# Patient Record
Sex: FEMALE | Race: OTHER | URBAN - METROPOLITAN AREA
[De-identification: names, ages, dates, MRNs, and addresses within clinical notes are randomized per-mention and may not be internally consistent; named-entity substitution may affect disease eponyms.]

---
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
## 2019-07-30 ENCOUNTER — EMERGENCY (EMERGENCY)
Facility: HOSPITAL | Age: 19
LOS: 0 days | Discharge: HOME | End: 2019-07-31
Attending: EMERGENCY MEDICINE | Admitting: EMERGENCY MEDICINE
Payer: SUBSIDIZED

## 2019-07-30 VITALS
HEART RATE: 56 BPM | TEMPERATURE: 98 F | OXYGEN SATURATION: 99 % | RESPIRATION RATE: 18 BRPM | DIASTOLIC BLOOD PRESSURE: 68 MMHG | SYSTOLIC BLOOD PRESSURE: 112 MMHG

## 2019-07-30 VITALS
SYSTOLIC BLOOD PRESSURE: 135 MMHG | RESPIRATION RATE: 18 BRPM | TEMPERATURE: 98 F | OXYGEN SATURATION: 98 % | HEART RATE: 57 BPM | DIASTOLIC BLOOD PRESSURE: 69 MMHG

## 2019-07-30 DIAGNOSIS — N83.209 UNSPECIFIED OVARIAN CYST, UNSPECIFIED SIDE: ICD-10-CM

## 2019-07-30 DIAGNOSIS — N63.0 UNSPECIFIED LUMP IN UNSPECIFIED BREAST: ICD-10-CM

## 2019-07-30 DIAGNOSIS — R10.9 UNSPECIFIED ABDOMINAL PAIN: ICD-10-CM

## 2019-07-30 DIAGNOSIS — Z88.0 ALLERGY STATUS TO PENICILLIN: ICD-10-CM

## 2019-07-30 DIAGNOSIS — R11.0 NAUSEA: ICD-10-CM

## 2019-07-30 LAB
ALBUMIN SERPL ELPH-MCNC: 4.4 G/DL — SIGNIFICANT CHANGE UP (ref 3.5–5.2)
ALP SERPL-CCNC: 78 U/L — SIGNIFICANT CHANGE UP (ref 30–115)
ALT FLD-CCNC: 26 U/L — SIGNIFICANT CHANGE UP (ref 0–41)
ANION GAP SERPL CALC-SCNC: 18 MMOL/L — HIGH (ref 7–14)
APPEARANCE UR: ABNORMAL
AST SERPL-CCNC: 22 U/L — SIGNIFICANT CHANGE UP (ref 0–41)
BACTERIA # UR AUTO: ABNORMAL /HPF
BASOPHILS # BLD AUTO: 0.03 K/UL — SIGNIFICANT CHANGE UP (ref 0–0.2)
BASOPHILS NFR BLD AUTO: 0.4 % — SIGNIFICANT CHANGE UP (ref 0–1)
BILIRUB SERPL-MCNC: 0.3 MG/DL — SIGNIFICANT CHANGE UP (ref 0.2–1.2)
BILIRUB UR-MCNC: ABNORMAL
BUN SERPL-MCNC: 4 MG/DL — LOW (ref 10–20)
CALCIUM SERPL-MCNC: 10.1 MG/DL — SIGNIFICANT CHANGE UP (ref 8.5–10.1)
CHLORIDE SERPL-SCNC: 102 MMOL/L — SIGNIFICANT CHANGE UP (ref 98–110)
CO2 SERPL-SCNC: 23 MMOL/L — SIGNIFICANT CHANGE UP (ref 17–32)
COLOR SPEC: ABNORMAL
CREAT SERPL-MCNC: 0.7 MG/DL — SIGNIFICANT CHANGE UP (ref 0.7–1.5)
DIFF PNL FLD: ABNORMAL
EOSINOPHIL # BLD AUTO: 0.05 K/UL — SIGNIFICANT CHANGE UP (ref 0–0.7)
EOSINOPHIL NFR BLD AUTO: 0.6 % — SIGNIFICANT CHANGE UP (ref 0–8)
EPI CELLS # UR: ABNORMAL /HPF
GLUCOSE SERPL-MCNC: 103 MG/DL — HIGH (ref 70–99)
GLUCOSE UR QL: NEGATIVE — SIGNIFICANT CHANGE UP
HCG UR QL: NEGATIVE — SIGNIFICANT CHANGE UP
HCT VFR BLD CALC: 42.8 % — SIGNIFICANT CHANGE UP (ref 37–47)
HGB BLD-MCNC: 14 G/DL — SIGNIFICANT CHANGE UP (ref 12–16)
IMM GRANULOCYTES NFR BLD AUTO: 0.6 % — HIGH (ref 0.1–0.3)
KETONES UR-MCNC: >=80
LACTATE SERPL-SCNC: 1.2 MMOL/L — SIGNIFICANT CHANGE UP (ref 0.5–2.2)
LEUKOCYTE ESTERASE UR-ACNC: ABNORMAL
LIDOCAIN IGE QN: 10 U/L — SIGNIFICANT CHANGE UP (ref 7–60)
LYMPHOCYTES # BLD AUTO: 1.12 K/UL — LOW (ref 1.2–3.4)
LYMPHOCYTES # BLD AUTO: 14 % — LOW (ref 20.5–51.1)
MCHC RBC-ENTMCNC: 28.5 PG — SIGNIFICANT CHANGE UP (ref 27–31)
MCHC RBC-ENTMCNC: 32.7 G/DL — SIGNIFICANT CHANGE UP (ref 32–37)
MCV RBC AUTO: 87.2 FL — SIGNIFICANT CHANGE UP (ref 81–99)
MONOCYTES # BLD AUTO: 0.39 K/UL — SIGNIFICANT CHANGE UP (ref 0.1–0.6)
MONOCYTES NFR BLD AUTO: 4.9 % — SIGNIFICANT CHANGE UP (ref 1.7–9.3)
NEUTROPHILS # BLD AUTO: 6.36 K/UL — SIGNIFICANT CHANGE UP (ref 1.4–6.5)
NEUTROPHILS NFR BLD AUTO: 79.5 % — HIGH (ref 42.2–75.2)
NITRITE UR-MCNC: POSITIVE
NRBC # BLD: 0 /100 WBCS — SIGNIFICANT CHANGE UP (ref 0–0)
PH UR: 5.5 — SIGNIFICANT CHANGE UP (ref 5–8)
PLATELET # BLD AUTO: 200 K/UL — SIGNIFICANT CHANGE UP (ref 130–400)
POTASSIUM SERPL-MCNC: 4.1 MMOL/L — SIGNIFICANT CHANGE UP (ref 3.5–5)
POTASSIUM SERPL-SCNC: 4.1 MMOL/L — SIGNIFICANT CHANGE UP (ref 3.5–5)
PROT SERPL-MCNC: 7.9 G/DL — SIGNIFICANT CHANGE UP (ref 6–8)
PROT UR-MCNC: 100
RBC # BLD: 4.91 M/UL — SIGNIFICANT CHANGE UP (ref 4.2–5.4)
RBC # FLD: 16.3 % — HIGH (ref 11.5–14.5)
RBC CASTS # UR COMP ASSIST: >50 /HPF
SODIUM SERPL-SCNC: 143 MMOL/L — SIGNIFICANT CHANGE UP (ref 135–146)
SP GR SPEC: 1.02 — SIGNIFICANT CHANGE UP (ref 1.01–1.03)
UROBILINOGEN FLD QL: 1 (ref 0.2–0.2)
WBC # BLD: 8 K/UL — SIGNIFICANT CHANGE UP (ref 4.8–10.8)
WBC # FLD AUTO: 8 K/UL — SIGNIFICANT CHANGE UP (ref 4.8–10.8)
WBC UR QL: ABNORMAL /HPF

## 2019-07-30 PROCEDURE — 99283 EMERGENCY DEPT VISIT LOW MDM: CPT

## 2019-07-30 PROCEDURE — 99284 EMERGENCY DEPT VISIT MOD MDM: CPT

## 2019-07-30 PROCEDURE — 74177 CT ABD & PELVIS W/CONTRAST: CPT | Mod: 26

## 2019-07-30 PROCEDURE — 74022 RADEX COMPL AQT ABD SERIES: CPT | Mod: 26

## 2019-07-30 RX ORDER — ONDANSETRON 8 MG/1
4 TABLET, FILM COATED ORAL ONCE
Refills: 0 | Status: COMPLETED | OUTPATIENT
Start: 2019-07-30 | End: 2019-07-30

## 2019-07-30 RX ORDER — IOHEXOL 300 MG/ML
30 INJECTION, SOLUTION INTRAVENOUS ONCE
Refills: 0 | Status: COMPLETED | OUTPATIENT
Start: 2019-07-30 | End: 2019-07-30

## 2019-07-30 RX ORDER — SODIUM CHLORIDE 9 MG/ML
1000 INJECTION INTRAMUSCULAR; INTRAVENOUS; SUBCUTANEOUS ONCE
Refills: 0 | Status: COMPLETED | OUTPATIENT
Start: 2019-07-30 | End: 2019-07-30

## 2019-07-30 RX ADMIN — ONDANSETRON 4 MILLIGRAM(S): 8 TABLET, FILM COATED ORAL at 19:04

## 2019-07-30 RX ADMIN — SODIUM CHLORIDE 1000 MILLILITER(S): 9 INJECTION INTRAMUSCULAR; INTRAVENOUS; SUBCUTANEOUS at 19:04

## 2019-07-30 RX ADMIN — IOHEXOL 30 MILLILITER(S): 300 INJECTION, SOLUTION INTRAVENOUS at 19:00

## 2019-07-30 RX ADMIN — SODIUM CHLORIDE 1000 MILLILITER(S): 9 INJECTION INTRAMUSCULAR; INTRAVENOUS; SUBCUTANEOUS at 21:25

## 2019-07-30 NOTE — CONSULT NOTE ADULT - ASSESSMENT
Assessment:  19 year old female 3 months s/p laparoscopic sleeve gastrectomy in [place], lost 60lbs, presents with persistent nausea and dry heave that starts in the morning but resolves at night. She is currently on a smoothie diet and can only tolerate PO in the evening.     Plan:  - f/u CT AP read  - PPI  - abx for UTI   - No acute surgical intervention  - f/u with primary surgeon Assessment:  19 year old female 3 months s/p laparoscopic sleeve gastrectomy in [place], lost 60lbs, presents with persistent nausea and dry heave that starts in the morning but resolves at night. She is currently on a smoothie diet and can only tolerate PO in the evening.     Plan:  - f/u CT AP read  - PPI  - abx for UTI   - No acute surgical intervention  - f/u with primary surgeon    Senior Resident Note  20yo f 3 months out from lap sleeve done in [place] with am dry heaving which resolves in pm for 5 days, otherwise tolerating diet, no abd pain  abd soft nt nd   ct shows no thickening or twisting of sleeves  labs wnl  +uti needs abx  outpt gi fu for egd/ upper gi series  no acute surgical intervention  Pt seen and examined  Above note has been reviewed and edited  Plan d/w patient and Dr Danita Metcalf Assessment:  19 year old female 3 months s/p laparoscopic sleeve gastrectomy in [place], lost 60lbs, presents with persistent nausea and dry heave that starts in the morning but resolves at night. She is currently on a smoothie diet and can only tolerate PO in the evening.     Plan:  - f/u CT AP read  - PPI  - abx for UTI   - No acute surgical intervention  - f/u with primary surgeon    Senior Resident Note  18yo f 3 months out from lap sleeve done in [place] with am dry heaving which resolves in pm for 5 days, otherwise tolerating diet, no abd pain  abd soft nt nd   ct shows no thickening or twisting of sleeves  labs wnl  outpt gi fu for egd/ upper gi series  no acute surgical intervention out pt fu with her surgeon  +uti needs abx  gyn for ovarian cyst   Pt seen and examined  Above note has been reviewed and edited  Plan d/w patient and Dr Danita Metcalf

## 2019-07-30 NOTE — CONSULT NOTE ADULT - ATTENDING COMMENTS
18yo female s/p laparoscopic sleeve gastrectomy [place] presenting with dry heaving. CT A/P WNL. Recommend F/U with GI and her surgeon if tolerating clears.

## 2019-07-30 NOTE — CONSULT NOTE ADULT - SUBJECTIVE AND OBJECTIVE BOX
MOHSEN, ALAA [id number]  19y Female      HPI:   19 year old female s/p laparoscopic sleeve gastrectomy 3 months ago in [place] presents to ED due to persistent dry heaving for 1 week. Per patient, she experiences nausea and dry heaving in the morning which resolves in the evening, she c/o waterbrash a/w nausea and attributed it to reflux. On further questioning, the patient is a non smoker, does not take any regular medication including NSAIDs, is on a "smoothie diet" and has lost 60lbs since the lap sleeve gastrectomy, and can only tolerate PO in the evening. UA positive possible UTI. CT abdomen and pelvis pending read.     PAST MEDICAL & SURGICAL HISTORY:  h/o SCFE s/p b/l fixation  s/p sleeve gastrectomy in [place] 2019    MEDICATIONS: does not take any medication at home    Allergies:  penicillin (anaphylactic reaction)    REVIEW OF SYSTEMS    [X] A ten-point review of systems was otherwise negative except as noted.  [ ] Due to altered mental status/intubation, subjective information were not able to be obtained from the patient. History was obtained, to the extent possible, from review of the chart and collateral sources of information.      Vital Signs Last 24 Hrs  T(C): 36.4 (2019 17:33), Max: 36.4 (2019 17:33)  T(F): 97.6 (2019 17:33), Max: 97.6 (2019 17:33)  HR: 57 (2019 17:33) (57 - 57)  BP: 135/69 (2019 17:33) (135/69 - 135/69)  RR: 18 (2019 17:33) (18 - 18)  SpO2: 98% (2019 17:33) (98% - 98%)    PHYSICAL EXAM:  GENERAL: NAD, well-appearing  CHEST/LUNG: Clear to auscultation bilaterally  HEART: Regular rate and rhythm  ABDOMEN: Soft, Nontender, Nondistended;   EXTREMITIES:  No clubbing, cyanosis, or edema      LABS:             14.0   8.00  )-----------( 200      ( 2019 18:50 )             42.8       Auto Neutrophil %: 79.5 % (19 @ 18:50)  Auto Immature Granulocyte %: 0.6 % (19 @ 18:50)        143  |  102  |  4<L>  ----------------------------<  103<H>  4.1   |  23  |  0.7    Calcium, Total Serum: 10.1 mg/dL (19 @ 18:50)    LFTs:             7.9  | 0.3  | 22       ------------------[78      ( 2019 18:50 )  4.4  | x    | 26          Lipase:10     Amylase:x         Lactate, Blood: 1.2 mmol/L (19 @ 18:50)    Urinalysis Basic - ( 2019 19:54 )    Color: Red / Appearance: Turbid / S.025 / pH: x  Gluc: x / Ketone: >=80  / Bili: Moderate / Urobili: 1.0   Blood: x / Protein: 100 / Nitrite: Positive   Leuk Esterase: Moderate / RBC: >50 /HPF / WBC 6-10 /HPF   Sq Epi: x / Non Sq Epi: Few /HPF / Bacteria: Many /HPF MOHSEN, ALAA [id number]  19y Female      HPI:   19 year old female s/p laparoscopic sleeve gastrectomy 3 months ago in [place] presents to ED due to persistent dry heaving for 1 week. Per patient, she experiences nausea and dry heaving in the morning which resolves in the evening, she c/o waterbrash a/w nausea and attributed it to reflux. On further questioning, the patient is a non smoker, does not take any regular medication including NSAIDs, is on a "smoothie diet" and has lost 60lbs since the lap sleeve gastrectomy, and can only tolerate PO in the evening. UA positive for UTI. CT abdomen and pelvis neg.     PAST MEDICAL & SURGICAL HISTORY:  h/o SCFE s/p b/l fixation  s/p sleeve gastrectomy in [place] 2019    MEDICATIONS: does not take any medication at home    Allergies:  penicillin (anaphylactic reaction)    REVIEW OF SYSTEMS    [X] A ten-point review of systems was otherwise negative except as noted.  [ ] Due to altered mental status/intubation, subjective information were not able to be obtained from the patient. History was obtained, to the extent possible, from review of the chart and collateral sources of information.      Vital Signs Last 24 Hrs  T(C): 36.4 (2019 17:33), Max: 36.4 (2019 17:33)  T(F): 97.6 (2019 17:33), Max: 97.6 (2019 17:33)  HR: 57 (2019 17:33) (57 - 57)  BP: 135/69 (2019 17:33) (135/69 - 135/69)  RR: 18 (2019 17:33) (18 - 18)  SpO2: 98% (2019 17:33) (98% - 98%)    PHYSICAL EXAM:  GENERAL: NAD, well-appearing  CHEST/LUNG: Clear to auscultation bilaterally  HEART: Regular rate and rhythm  ABDOMEN: Soft, Nontender, Nondistended;   EXTREMITIES:  No clubbing, cyanosis, or edema      LABS:             14.0   8.00  )-----------( 200      ( 2019 18:50 )             42.8       Auto Neutrophil %: 79.5 % (19 @ 18:50)  Auto Immature Granulocyte %: 0.6 % (19 @ 18:50)    07-30    143  |  102  |  4<L>  ----------------------------<  103<H>  4.1   |  23  |  0.7    Calcium, Total Serum: 10.1 mg/dL (19 @ 18:50)    LFTs:             7.9  | 0.3  | 22       ------------------[78      ( 2019 18:50 )  4.4  | x    | 26          Lipase:10     Amylase:x         Lactate, Blood: 1.2 mmol/L (19 @ 18:50)    Urinalysis Basic - ( 2019 19:54 )    Color: Red / Appearance: Turbid / S.025 / pH: x  Gluc: x / Ketone: >=80  / Bili: Moderate / Urobili: 1.0   Blood: x / Protein: 100 / Nitrite: Positive   Leuk Esterase: Moderate / RBC: >50 /HPF / WBC 6-10 /HPF   Sq Epi: x / Non Sq Epi: Few /HPF / Bacteria: Many /HPF    < from: CT Abdomen and Pelvis w/ Oral Cont and w/ IV Cont (19 @ 22:41) >  IMPRESSION:    No CT evidence for acute bowel pathology.  5.6 cm left ovarian cyst.  1.5 cm left breast nodule. Outpatient baseline mammogram/ultrasound can   be obtained.    < end of copied text >

## 2019-07-30 NOTE — CONSULT NOTE ADULT - CONSULT REASON
nausea s/p sleeve gastrectomy in [place] 3 months ago nausea s/p sleeve gastrectomy in [place] 3 months ago

## 2019-07-30 NOTE — ED ADULT NURSE NOTE - NSFALLRSKUNASSIST_ED_ALL_ED
Please tell pt the WBC is low again. Please ask her to decrease azathioprine to 100mg daily instead of 125mg daily and schedule repeat cbc in 10 days. Thanks MINA  
no

## 2019-07-30 NOTE — ED ADULT NURSE NOTE - OBJECTIVE STATEMENT
states was texted from doctor who did abdomen surgery 2 months to come in to "check if there is fluid in stomach" patient denies abdomen pain, wants to have something to drink, denies n/v/f/d

## 2019-07-31 RX ORDER — NITROFURANTOIN MACROCRYSTAL 50 MG
1 CAPSULE ORAL
Qty: 10 | Refills: 0
Start: 2019-07-31 | End: 2019-08-04

## 2019-07-31 RX ORDER — NITROFURANTOIN MACROCRYSTAL 50 MG
100 CAPSULE ORAL ONCE
Refills: 0 | Status: COMPLETED | OUTPATIENT
Start: 2019-07-31 | End: 2019-07-31

## 2019-07-31 RX ADMIN — Medication 100 MILLIGRAM(S): at 01:13

## 2019-07-31 NOTE — ED PROVIDER NOTE - CARE PLAN
Principal Discharge DX:	Abdominal pain  Secondary Diagnosis:	Ovarian cyst  Secondary Diagnosis:	Breast nodule

## 2019-07-31 NOTE — ED PROVIDER NOTE - ATTENDING CONTRIBUTION TO CARE
18 yo female with PMH gastric sleeve several months ago in [place] presents c/o vomiting x 1 day. Pt denies any diarrhea, abdominal pain, urinary complaints or vaginal d/c. No HA, dizziness or weakness. States decreased PO and decreased appetite. no fevers, cough, CP, SOB or palpitations. Pt spoke with her surgeon who advised her to go to ED for evaluation due to concerns for complications.     VITAL SIGNS: noted  CONSTITUTIONAL: Well-developed; well-nourished; in no acute distress  HEAD: Normocephalic; atraumatic  EYES: PERRL, EOM intact; conjunctiva and sclera clear  ENT: No nasal discharge; airway clear. MMM  NECK: Supple; non tender. No anterior cervical lymphadenopathy noted  CARD: S1, S2 normal; no murmurs, gallops, or rubs. Regular rate and rhythm  RESP: CTAB/L, no wheezes, rales or rhonchi  ABD: Normal bowel sounds; soft; non-distended; non-tender; no hepatosplenomegaly. No CVA tenderness  EXT: Normal ROM. No calf tenderness or edema. Distal pulses intact  NEURO: Alert, oriented. Grossly unremarkable. No focal deficits  SKIN: Skin exam is warm and dry   MS: No midline spinal tenderness

## 2019-07-31 NOTE — ED PROVIDER NOTE - CARE PROVIDERS DIRECT ADDRESSES
[email or web address], [email or web address]

## 2019-07-31 NOTE — ED PROVIDER NOTE - PROVIDER TOKENS
PROVIDER:[TOKEN:[[id number]],FOLLOWUP:[1-3 Days]],PROVIDER:[TOKEN:[[id number]],FOLLOWUP:[1-3 Days]]

## 2019-07-31 NOTE — ED PROVIDER NOTE - PHYSICAL EXAMINATION
CONST: Well appearing in NAD  EYES: Sclera and conjunctiva clear  CARD: Normal S1 S2; Normal rate and rhythm  RESP: Equal BS B/L, No wheezes, rhonchi or rales. No distress  GI: Soft, non-tender, non-distended, no CVA tenderness  MS: Normal ROM in all extremities. No edema of lower extremities, no calf pain, radial pulses 2+ bilaterally  SKIN: Warm, dry, no acute rashes. Good turgor  NEURO: A&Ox3, No focal deficits. Strength 5/5 with no sensory deficits. Steady gait

## 2019-07-31 NOTE — ED PROVIDER NOTE - NS ED ROS FT
Constitutional: See HPI.  Eyes: No visual changes, eye pain or discharge.   ENMT: No hearing changes, pain, discharge or infections.   Cardiac: No SOB or edema. No chest pain with exertion.  Respiratory: No cough or respiratory distress.   GI: + nausea, + dry heave.  No vomiting, diarrhea or abdominal pain.  : No dysuria, frequency or burning. No Discharge  MS: No myalgia, muscle weakness, joint pain or back pain.  Neuro: No headache or weakness.  Skin: No skin rash.  Except as documented in the HPI, all other systems are negative.

## 2019-07-31 NOTE — ED PROVIDER NOTE - CLINICAL SUMMARY MEDICAL DECISION MAKING FREE TEXT BOX
Pt reassessed, feels well to go home. no vomiting in ED, reports sx improved, abd soft NT. Tolerated PO.  Labs and imaging reviewed with pt.  Advised close follow up with GYN and PMD this week and pt agrees. GI referral also given. CT results discussed at length, pt states she is aware of breast nodule and will follow up; notified about ovarian cyst and need for close follow up and return precautions. Prescribed Macrobid and strict return precautions advised and pt verbalized understanding.

## 2019-07-31 NOTE — ED PROVIDER NOTE - NSFOLLOWUPCLINICS_GEN_ALL_ED_FT
[facility]  OB/GYN  [address] [place] [address]  Phone: [phone]  Fax:   Follow Up Time: 1-3 Days    [facility]  Medicine  [address], [place]   Phone: [phone]  Fax:   Follow Up Time: 1-3 Days

## 2019-07-31 NOTE — ED PROVIDER NOTE - NSFOLLOWUPINSTRUCTIONS_ED_ALL_ED_FT
Abdominal Pain    Many things can cause abdominal pain. Many times, abdominal pain is not caused by a disease and will improve without treatment. Your health care provider will do a physical exam to determine if there is a dangerous cause of your pain; blood tests and imaging may help determine the cause of your pain. However, in many cases, no cause may be found and you may need further testing as an outpatient. Monitor your abdominal pain for any changes.     SEEK IMMEDIATE MEDICAL CARE IF YOU HAVE ANY OF THE FOLLOWING SYMPTOMS: worsening abdominal pain, uncontrollable vomiting, profuse diarrhea, inability to have bowel movements or pass gas, black or bloody stools, fever accompanying chest pain or back pain, or fainting. These symptoms may represent a serious problem that is an emergency. Do not wait to see if the symptoms will go away. Get medical help right away. Call 911 and do not drive yourself to the hospital.    Ovarian Cyst    An ovarian cyst is a fluid-filled sac that forms on an ovary. The ovaries are small organs that produce eggs in women. Various types of cysts can form on the ovaries. Most are not cancerous. Many do not cause problems, and they often go away on their own. Some may cause symptoms and require treatment. Common types of ovarian cysts include:     Functional cysts—These cysts may occur every month during the menstrual cycle. This is normal. The cysts usually go away with the next menstrual cycle if the woman does not get pregnant. Usually, there are no symptoms with a functional cyst.  Endometrioma cysts—These cysts form from the tissue that lines the uterus. They are also called "chocolate cysts" because they become filled with blood that turns brown. This type of cyst can cause pain in the lower abdomen during intercourse and with your menstrual period.  Cystadenoma cysts—This type develops from the cells on the outside of the ovary. These cysts can get very big and cause lower abdomen pain and pain with intercourse. This type of cyst can twist on itself, cut off its blood supply, and cause severe pain. It can also easily rupture and cause a lot of pain.  Dermoid cysts—This type of cyst is sometimes found in both ovaries. These cysts may contain different kinds of body tissue, such as skin, teeth, hair, or cartilage. They usually do not cause symptoms unless they get very big.   Theca lutein cysts—These cysts occur when too much of a certain hormone (human chorionic gonadotropin) is produced and overstimulates the ovaries to produce an egg. This is most common after procedures used to assist with the conception of a baby (in vitro fertilization).    CAUSES  Fertility drugs can cause a condition in which multiple large cysts are formed on the ovaries. This is called ovarian hyperstimulation syndrome.   A condition called polycystic ovary syndrome can cause hormonal imbalances that can lead to nonfunctional ovarian cysts.     SIGNS AND SYMPTOMS  Many ovarian cysts do not cause symptoms. If symptoms are present, they may include:    Pelvic pain or pressure.  Pain in the lower abdomen.  Pain during sexual intercourse.  Increasing girth (swelling) of the abdomen.  Abnormal menstrual periods.  Increasing pain with menstrual periods.  Stopping having menstrual periods without being pregnant.    DIAGNOSIS  These cysts are commonly found during a routine or annual pelvic exam. Tests may be ordered to find out more about the cyst. These tests may include:    Ultrasound.  X-ray of the pelvis.  CT scan.  MRI.  Blood tests.    TREATMENT  Many ovarian cysts go away on their own without treatment. Your health care provider may want to check your cyst regularly for 2–3 months to see if it changes. For women in menopause, it is particularly important to monitor a cyst closely because of the higher rate of ovarian cancer in menopausal women. When treatment is needed, it may include any of the following:    A procedure to drain the cyst (aspiration). This may be done using a long needle and ultrasound. It can also be done through a laparoscopic procedure. This involves using a thin, lighted tube with a tiny camera on the end (laparoscope) inserted through a small incision.   Surgery to remove the whole cyst. This may be done using laparoscopic surgery or an open surgery involving a larger incision in the lower abdomen.   Hormone treatment or birth control pills. These methods are sometimes used to help dissolve a cyst.    HOME CARE INSTRUCTIONS  Only take over-the-counter or prescription medicines as directed by your health care provider.   Follow up with your health care provider as directed.   Get regular pelvic exams and Pap tests.    SEEK MEDICAL CARE IF:  Your periods are late, irregular, or painful, or they stop.  Your pelvic pain or abdominal pain does not go away.  Your abdomen becomes larger or swollen.  You have pressure on your bladder or trouble emptying your bladder completely.  You have pain during sexual intercourse.  You have feelings of fullness, pressure, or discomfort in your stomach.  You lose weight for no apparent reason.  You feel generally ill.  You become constipated.  You lose your appetite.  You develop acne.  You have an increase in body and facial hair.  You are gaining weight, without changing your exercise and eating habits.  You think you are pregnant.    SEEK IMMEDIATE MEDICAL CARE IF:  You have increasing abdominal pain.  You feel sick to your stomach (nauseous), and you throw up (vomit).  You develop a fever that comes on suddenly.  You have abdominal pain during a bowel movement.  Your menstrual periods become heavier than usual.    Breast Self-Awareness  Breast self-awareness means being familiar with how your breasts look and feel. It involves checking your breasts regularly and reporting any changes to your health care provider.    Practicing breast self-awareness is important. A change in your breasts can be a sign of a serious medical problem. Being familiar with how your breasts look and feel allows you to find any problems early, when treatment is more likely to be successful. All women should practice breast self-awareness, including women who have had breast implants.    How to do a breast self-exam  One way to learn what is normal for your breasts and whether your breasts are changing is to do a breast self-exam. To do a breast self-exam:    Look for Changes     Image   Remove all the clothing above your waist.     front of a mirror in a room with good lighting.    Put your hands on your hips.    Push your hands firmly downward.    Compare your breasts in the mirror. Look for differences between them (asymmetry), such as:  Differences in shape.  Differences in size.  Puckers, dips, and bumps in one breast and not the other.  Look at each breast for changes in your skin, such as:  Redness.  Scaly areas.  Look for changes in your nipples, such as:  Discharge.  Bleeding.  Dimpling.  Redness.  A change in position.  Feel for Changes     ImageCarefully feel your breasts for lumps and changes. It is best to do this while lying on your back on the floor and again while sitting or standing in the shower or tub with soapy water on your skin. Feel each breast in the following way:  Place the arm on the side of the breast you are examining above your head.  Feel your breast with the other hand.  Start in the nipple area and make ¾ inch (2 cm) overlapping circles to feel your breast. Use the pads of your three middle fingers to do this. Apply light pressure, then medium pressure, then firm pressure. The light pressure will allow you to feel the tissue closest to the skin. The medium pressure will allow you to feel the tissue that is a little deeper. The firm pressure will allow you to feel the tissue close to the ribs.  Continue the overlapping circles, moving downward over the breast until you feel your ribs below your breast.  Move one finger-width toward the center of the body. Continue to use the ¾ inch (2 cm) overlapping circles to feel your breast as you move slowly up toward your collarbone.  Continue the up and down exam using all three pressures until you reach your armpit.  Write Down What You Find     Image   Write down what is normal for each breast and any changes that you find. Keep a written record with breast changes or normal findings for each breast. By writing this information down, you do not need to depend only on memory for size, tenderness, or location. Write down where you are in your menstrual cycle, if you are still menstruating.    If you are having trouble noticing differences in your breasts, do not get discouraged. With time you will become more familiar with the variations in your breasts and more comfortable with the exam.    How often should I examine my breasts?  Examine your breasts every month. If you are breastfeeding, the best time to examine your breasts is after a feeding or after using a breast pump. If you menstruate, the best time to examine your breasts is 5–7 days after your period is over. During your period, your breasts are lumpier, and it may be more difficult to notice changes.    When should I see my health care provider?  See your health care provider if you notice:  A change in shape or size of your breasts or nipples.  A change in the skin of your breast or nipples, such as a reddened or scaly area.  Unusual discharge from your nipples.  A lump or thick area that was not there before.  Pain in your breasts.  Anything that concerns you.  This information is not intended to replace advice given to you by your health care provider. Make sure you discuss any questions you have with your health care provider.    Follow up with your primary medical doctor in 1-2 days FOLLOW UP WITH YOUR DOCTOR THIS WEEK FOR REEVALUATION. [facility] FOLLOW UP AS WELL THIS WEEK    RETURN TO ED IMMEDIATELY WITH ANY WORSENING SYMPTOMS, CHEST PAIN, SHORTNESS OF BREATH, FEVERS, WEAKNESS, DIZZINESS, PERSISTENT OR SEVERE HEADACHE OR ANY OTHER CONCERNS.     Abdominal Pain    Many things can cause abdominal pain. Many times, abdominal pain is not caused by a disease and will improve without treatment. Your health care provider will do a physical exam to determine if there is a dangerous cause of your pain; blood tests and imaging may help determine the cause of your pain. However, in many cases, no cause may be found and you may need further testing as an outpatient. Monitor your abdominal pain for any changes.     SEEK IMMEDIATE MEDICAL CARE IF YOU HAVE ANY OF THE FOLLOWING SYMPTOMS: worsening abdominal pain, uncontrollable vomiting, profuse diarrhea, inability to have bowel movements or pass gas, black or bloody stools, fever accompanying chest pain or back pain, or fainting. These symptoms may represent a serious problem that is an emergency. Do not wait to see if the symptoms will go away. Get medical help right away. Call 911 and do not drive yourself to the hospital.    Ovarian Cyst    An ovarian cyst is a fluid-filled sac that forms on an ovary. The ovaries are small organs that produce eggs in women. Various types of cysts can form on the ovaries. Most are not cancerous. Many do not cause problems, and they often go away on their own. Some may cause symptoms and require treatment. Common types of ovarian cysts include:     Functional cysts—These cysts may occur every month during the menstrual cycle. This is normal. The cysts usually go away with the next menstrual cycle if the woman does not get pregnant. Usually, there are no symptoms with a functional cyst.  Endometrioma cysts—These cysts form from the tissue that lines the uterus. They are also called "chocolate cysts" because they become filled with blood that turns brown. This type of cyst can cause pain in the lower abdomen during intercourse and with your menstrual period.  Cystadenoma cysts—This type develops from the cells on the outside of the ovary. These cysts can get very big and cause lower abdomen pain and pain with intercourse. This type of cyst can twist on itself, cut off its blood supply, and cause severe pain. It can also easily rupture and cause a lot of pain.  Dermoid cysts—This type of cyst is sometimes found in both ovaries. These cysts may contain different kinds of body tissue, such as skin, teeth, hair, or cartilage. They usually do not cause symptoms unless they get very big.   Theca lutein cysts—These cysts occur when too much of a certain hormone (human chorionic gonadotropin) is produced and overstimulates the ovaries to produce an egg. This is most common after procedures used to assist with the conception of a baby (in vitro fertilization).    CAUSES  Fertility drugs can cause a condition in which multiple large cysts are formed on the ovaries. This is called ovarian hyperstimulation syndrome.   A condition called polycystic ovary syndrome can cause hormonal imbalances that can lead to nonfunctional ovarian cysts.     SIGNS AND SYMPTOMS  Many ovarian cysts do not cause symptoms. If symptoms are present, they may include:    Pelvic pain or pressure.  Pain in the lower abdomen.  Pain during sexual intercourse.  Increasing girth (swelling) of the abdomen.  Abnormal menstrual periods.  Increasing pain with menstrual periods.  Stopping having menstrual periods without being pregnant.    DIAGNOSIS  These cysts are commonly found during a routine or annual pelvic exam. Tests may be ordered to find out more about the cyst. These tests may include:    Ultrasound.  X-ray of the pelvis.  CT scan.  MRI.  Blood tests.    TREATMENT  Many ovarian cysts go away on their own without treatment. Your health care provider may want to check your cyst regularly for 2–3 months to see if it changes. For women in menopause, it is particularly important to monitor a cyst closely because of the higher rate of ovarian cancer in menopausal women. When treatment is needed, it may include any of the following:    A procedure to drain the cyst (aspiration). This may be done using a long needle and ultrasound. It can also be done through a laparoscopic procedure. This involves using a thin, lighted tube with a tiny camera on the end (laparoscope) inserted through a small incision.   Surgery to remove the whole cyst. This may be done using laparoscopic surgery or an open surgery involving a larger incision in the lower abdomen.   Hormone treatment or birth control pills. These methods are sometimes used to help dissolve a cyst.    HOME CARE INSTRUCTIONS  Only take over-the-counter or prescription medicines as directed by your health care provider.   Follow up with your health care provider as directed.   Get regular pelvic exams and Pap tests.    SEEK MEDICAL CARE IF:  Your periods are late, irregular, or painful, or they stop.  Your pelvic pain or abdominal pain does not go away.  Your abdomen becomes larger or swollen.  You have pressure on your bladder or trouble emptying your bladder completely.  You have pain during sexual intercourse.  You have feelings of fullness, pressure, or discomfort in your stomach.  You lose weight for no apparent reason.  You feel generally ill.  You become constipated.  You lose your appetite.  You develop acne.  You have an increase in body and facial hair.  You are gaining weight, without changing your exercise and eating habits.  You think you are pregnant.    SEEK IMMEDIATE MEDICAL CARE IF:  You have increasing abdominal pain.  You feel sick to your stomach (nauseous), and you throw up (vomit).  You develop a fever that comes on suddenly.  You have abdominal pain during a bowel movement.  Your menstrual periods become heavier than usual.    Breast Self-Awareness  Breast self-awareness means being familiar with how your breasts look and feel. It involves checking your breasts regularly and reporting any changes to your health care provider.    Practicing breast self-awareness is important. A change in your breasts can be a sign of a serious medical problem. Being familiar with how your breasts look and feel allows you to find any problems early, when treatment is more likely to be successful. All women should practice breast self-awareness, including women who have had breast implants.    How to do a breast self-exam  One way to learn what is normal for your breasts and whether your breasts are changing is to do a breast self-exam. To do a breast self-exam:    Look for Changes     Image   Remove all the clothing above your waist.     front of a mirror in a room with good lighting.    Put your hands on your hips.    Push your hands firmly downward.    Compare your breasts in the mirror. Look for differences between them (asymmetry), such as:  Differences in shape.  Differences in size.  Puckers, dips, and bumps in one breast and not the other.  Look at each breast for changes in your skin, such as:  Redness.  Scaly areas.  Look for changes in your nipples, such as:  Discharge.  Bleeding.  Dimpling.  Redness.  A change in position.  Feel for Changes     ImageCarefully feel your breasts for lumps and changes. It is best to do this while lying on your back on the floor and again while sitting or standing in the shower or tub with soapy water on your skin. Feel each breast in the following way:  Place the arm on the side of the breast you are examining above your head.  Feel your breast with the other hand.  Start in the nipple area and make ¾ inch (2 cm) overlapping circles to feel your breast. Use the pads of your three middle fingers to do this. Apply light pressure, then medium pressure, then firm pressure. The light pressure will allow you to feel the tissue closest to the skin. The medium pressure will allow you to feel the tissue that is a little deeper. The firm pressure will allow you to feel the tissue close to the ribs.  Continue the overlapping circles, moving downward over the breast until you feel your ribs below your breast.  Move one finger-width toward the center of the body. Continue to use the ¾ inch (2 cm) overlapping circles to feel your breast as you move slowly up toward your collarbone.  Continue the up and down exam using all three pressures until you reach your armpit.  Write Down What You Find     Image   Write down what is normal for each breast and any changes that you find. Keep a written record with breast changes or normal findings for each breast. By writing this information down, you do not need to depend only on memory for size, tenderness, or location. Write down where you are in your menstrual cycle, if you are still menstruating.    If you are having trouble noticing differences in your breasts, do not get discouraged. With time you will become more familiar with the variations in your breasts and more comfortable with the exam.    How often should I examine my breasts?  Examine your breasts every month. If you are breastfeeding, the best time to examine your breasts is after a feeding or after using a breast pump. If you menstruate, the best time to examine your breasts is 5–7 days after your period is over. During your period, your breasts are lumpier, and it may be more difficult to notice changes.    When should I see my health care provider?  See your health care provider if you notice:  A change in shape or size of your breasts or nipples.  A change in the skin of your breast or nipples, such as a reddened or scaly area.  Unusual discharge from your nipples.  A lump or thick area that was not there before.  Pain in your breasts.  Anything that concerns you.  This information is not intended to replace advice given to you by your health care provider. Make sure you discuss any questions you have with your health care provider.    Follow up with your primary medical doctor in 1-2 days

## 2019-07-31 NOTE — ED PROVIDER NOTE - OBJECTIVE STATEMENT
19 year old female s/p laparoscopic sleeve gastrectomy 3 months ago in [place] presents to ED for evaluation of N/V x 1 week.  For past week intermittent nausea and dry heaves for past week after eating.  Denies diarrhea, constipation, vaginal d/c or bleeding, urinary sxs, fever, chills, back pain.

## 2019-07-31 NOTE — ED PROVIDER NOTE - PROGRESS NOTE DETAILS
Discussed results with pt.  All questions were answered and return precautions discussed.  Pt is asx and comfortable at this time.  Unremarkable re-exam.  No further concerns at this time from pt.  Will follow up with PMD.  Pt understands and agrees with tx plan. discussed all results with pt.  f/u with GI for abd pain and Gyn for breast nodule and ovarian cyst.  discussed all incidental findings. tolerating po in the ED. Discussed results with pt.  All questions were answered and return precautions discussed.  Pt is asx and comfortable at this time.  Unremarkable re-exam.  No further concerns at this time from pt.  Will follow up with PMD.  Pt understands and agrees with tx plan. discussed all results with pt.  f/u with GI for abd pain and Gyn for breast nodule and ovarian cyst.  discussed all incidental findings. tolerating po in the ED. cleared by surgery for d/c.

## 2019-07-31 NOTE — ED PROVIDER NOTE - CARE PROVIDER_API CALL
Jana Silva)  Internal Medicine  [address] [place] [id number]  Phone: [phone]  Fax: [phone]  Follow Up Time: 1-3 Days    Tony Quintero)  Obstetrics and Gynecology  [address] [place] [address]  Phone: [phone]  Fax: [phone]  Follow Up Time: 1-3 Days

## 2019-08-19 ENCOUNTER — EMERGENCY (EMERGENCY)
Facility: HOSPITAL | Age: 19
LOS: 0 days | Discharge: HOME | End: 2019-08-20
Attending: EMERGENCY MEDICINE | Admitting: EMERGENCY MEDICINE
Payer: MEDICAID

## 2019-08-19 VITALS
TEMPERATURE: 98 F | HEART RATE: 50 BPM | OXYGEN SATURATION: 97 % | SYSTOLIC BLOOD PRESSURE: 148 MMHG | DIASTOLIC BLOOD PRESSURE: 67 MMHG | RESPIRATION RATE: 18 BRPM

## 2019-08-19 DIAGNOSIS — Z98.84 BARIATRIC SURGERY STATUS: Chronic | ICD-10-CM

## 2019-08-19 DIAGNOSIS — Z88.0 ALLERGY STATUS TO PENICILLIN: ICD-10-CM

## 2019-08-19 DIAGNOSIS — R11.2 NAUSEA WITH VOMITING, UNSPECIFIED: ICD-10-CM

## 2019-08-19 DIAGNOSIS — R10.9 UNSPECIFIED ABDOMINAL PAIN: ICD-10-CM

## 2019-08-19 PROBLEM — Z78.9 OTHER SPECIFIED HEALTH STATUS: Chronic | Status: ACTIVE | Noted: 2019-07-30

## 2019-08-19 LAB
ALBUMIN SERPL ELPH-MCNC: 4.3 G/DL — SIGNIFICANT CHANGE UP (ref 3.5–5.2)
ALP SERPL-CCNC: 64 U/L — SIGNIFICANT CHANGE UP (ref 30–115)
ALT FLD-CCNC: 25 U/L — SIGNIFICANT CHANGE UP (ref 0–41)
ANION GAP SERPL CALC-SCNC: 16 MMOL/L — HIGH (ref 7–14)
APPEARANCE UR: ABNORMAL
AST SERPL-CCNC: 20 U/L — SIGNIFICANT CHANGE UP (ref 0–41)
BACTERIA # UR AUTO: ABNORMAL
BASOPHILS # BLD AUTO: 0.02 K/UL — SIGNIFICANT CHANGE UP (ref 0–0.2)
BASOPHILS NFR BLD AUTO: 0.2 % — SIGNIFICANT CHANGE UP (ref 0–1)
BILIRUB SERPL-MCNC: 0.4 MG/DL — SIGNIFICANT CHANGE UP (ref 0.2–1.2)
BILIRUB UR-MCNC: ABNORMAL
BUN SERPL-MCNC: 6 MG/DL — LOW (ref 10–20)
CALCIUM SERPL-MCNC: 9.7 MG/DL — SIGNIFICANT CHANGE UP (ref 8.5–10.1)
CHLORIDE SERPL-SCNC: 102 MMOL/L — SIGNIFICANT CHANGE UP (ref 98–110)
CO2 SERPL-SCNC: 24 MMOL/L — SIGNIFICANT CHANGE UP (ref 17–32)
COLOR SPEC: ABNORMAL
CREAT SERPL-MCNC: 0.6 MG/DL — LOW (ref 0.7–1.5)
DIFF PNL FLD: NEGATIVE — SIGNIFICANT CHANGE UP
EOSINOPHIL # BLD AUTO: 0 K/UL — SIGNIFICANT CHANGE UP (ref 0–0.7)
EOSINOPHIL NFR BLD AUTO: 0 % — SIGNIFICANT CHANGE UP (ref 0–8)
EPI CELLS # UR: >27 /HPF — HIGH (ref 0–5)
GLUCOSE SERPL-MCNC: 98 MG/DL — SIGNIFICANT CHANGE UP (ref 70–99)
GLUCOSE UR QL: NEGATIVE — SIGNIFICANT CHANGE UP
HCT VFR BLD CALC: 42.9 % — SIGNIFICANT CHANGE UP (ref 37–47)
HGB BLD-MCNC: 14 G/DL — SIGNIFICANT CHANGE UP (ref 12–16)
HYALINE CASTS # UR AUTO: NEGATIVE /LPF — SIGNIFICANT CHANGE UP (ref 0–7)
IMM GRANULOCYTES NFR BLD AUTO: 0.8 % — HIGH (ref 0.1–0.3)
KETONES UR-MCNC: ABNORMAL
LACTATE SERPL-SCNC: 1 MMOL/L — SIGNIFICANT CHANGE UP (ref 0.5–2.2)
LEUKOCYTE ESTERASE UR-ACNC: NEGATIVE — SIGNIFICANT CHANGE UP
LIDOCAIN IGE QN: 9 U/L — SIGNIFICANT CHANGE UP (ref 7–60)
LYMPHOCYTES # BLD AUTO: 1.4 K/UL — SIGNIFICANT CHANGE UP (ref 1.2–3.4)
LYMPHOCYTES # BLD AUTO: 14.3 % — LOW (ref 20.5–51.1)
MCHC RBC-ENTMCNC: 28.3 PG — SIGNIFICANT CHANGE UP (ref 27–31)
MCHC RBC-ENTMCNC: 32.6 G/DL — SIGNIFICANT CHANGE UP (ref 32–37)
MCV RBC AUTO: 86.7 FL — SIGNIFICANT CHANGE UP (ref 81–99)
MONOCYTES # BLD AUTO: 0.81 K/UL — HIGH (ref 0.1–0.6)
MONOCYTES NFR BLD AUTO: 8.3 % — SIGNIFICANT CHANGE UP (ref 1.7–9.3)
NEUTROPHILS # BLD AUTO: 7.49 K/UL — HIGH (ref 1.4–6.5)
NEUTROPHILS NFR BLD AUTO: 76.4 % — HIGH (ref 42.2–75.2)
NITRITE UR-MCNC: NEGATIVE — SIGNIFICANT CHANGE UP
NRBC # BLD: 0 /100 WBCS — SIGNIFICANT CHANGE UP (ref 0–0)
PH UR: 6.5 — SIGNIFICANT CHANGE UP (ref 5–8)
PLATELET # BLD AUTO: 222 K/UL — SIGNIFICANT CHANGE UP (ref 130–400)
POTASSIUM SERPL-MCNC: 4.2 MMOL/L — SIGNIFICANT CHANGE UP (ref 3.5–5)
POTASSIUM SERPL-SCNC: 4.2 MMOL/L — SIGNIFICANT CHANGE UP (ref 3.5–5)
PROT SERPL-MCNC: 7 G/DL — SIGNIFICANT CHANGE UP (ref 6–8)
PROT UR-MCNC: ABNORMAL
RBC # BLD: 4.95 M/UL — SIGNIFICANT CHANGE UP (ref 4.2–5.4)
RBC # FLD: 16.1 % — HIGH (ref 11.5–14.5)
RBC CASTS # UR COMP ASSIST: 2 /HPF — SIGNIFICANT CHANGE UP (ref 0–4)
SODIUM SERPL-SCNC: 142 MMOL/L — SIGNIFICANT CHANGE UP (ref 135–146)
SP GR SPEC: 1.04 — HIGH (ref 1.01–1.02)
UROBILINOGEN FLD QL: ABNORMAL
WBC # BLD: 9.8 K/UL — SIGNIFICANT CHANGE UP (ref 4.8–10.8)
WBC # FLD AUTO: 9.8 K/UL — SIGNIFICANT CHANGE UP (ref 4.8–10.8)
WBC UR QL: 10 /HPF — HIGH (ref 0–5)

## 2019-08-19 PROCEDURE — 93010 ELECTROCARDIOGRAM REPORT: CPT

## 2019-08-19 PROCEDURE — 99284 EMERGENCY DEPT VISIT MOD MDM: CPT

## 2019-08-19 RX ORDER — PANTOPRAZOLE SODIUM 20 MG/1
40 TABLET, DELAYED RELEASE ORAL ONCE
Refills: 0 | Status: COMPLETED | OUTPATIENT
Start: 2019-08-19 | End: 2019-08-19

## 2019-08-19 RX ORDER — METOCLOPRAMIDE HCL 10 MG
10 TABLET ORAL ONCE
Refills: 0 | Status: COMPLETED | OUTPATIENT
Start: 2019-08-19 | End: 2019-08-19

## 2019-08-19 RX ORDER — SODIUM CHLORIDE 9 MG/ML
1000 INJECTION, SOLUTION INTRAVENOUS ONCE
Refills: 0 | Status: COMPLETED | OUTPATIENT
Start: 2019-08-19 | End: 2019-08-19

## 2019-08-19 RX ORDER — ONDANSETRON 8 MG/1
4 TABLET, FILM COATED ORAL ONCE
Refills: 0 | Status: COMPLETED | OUTPATIENT
Start: 2019-08-19 | End: 2019-08-19

## 2019-08-19 RX ORDER — HALOPERIDOL DECANOATE 100 MG/ML
5 INJECTION INTRAMUSCULAR ONCE
Refills: 0 | Status: COMPLETED | OUTPATIENT
Start: 2019-08-19 | End: 2019-08-19

## 2019-08-19 RX ORDER — IOHEXOL 300 MG/ML
30 INJECTION, SOLUTION INTRAVENOUS ONCE
Refills: 0 | Status: COMPLETED | OUTPATIENT
Start: 2019-08-19 | End: 2019-08-19

## 2019-08-19 RX ADMIN — IOHEXOL 30 MILLILITER(S): 300 INJECTION, SOLUTION INTRAVENOUS at 22:00

## 2019-08-19 RX ADMIN — HALOPERIDOL DECANOATE 5 MILLIGRAM(S): 100 INJECTION INTRAMUSCULAR at 21:15

## 2019-08-19 RX ADMIN — PANTOPRAZOLE SODIUM 40 MILLIGRAM(S): 20 TABLET, DELAYED RELEASE ORAL at 19:12

## 2019-08-19 RX ADMIN — ONDANSETRON 4 MILLIGRAM(S): 8 TABLET, FILM COATED ORAL at 16:17

## 2019-08-19 RX ADMIN — Medication 10 MILLIGRAM(S): at 21:02

## 2019-08-19 RX ADMIN — SODIUM CHLORIDE 1000 MILLILITER(S): 9 INJECTION, SOLUTION INTRAVENOUS at 19:12

## 2019-08-19 RX ADMIN — SODIUM CHLORIDE 1000 MILLILITER(S): 9 INJECTION, SOLUTION INTRAVENOUS at 16:17

## 2019-08-19 NOTE — ED PROVIDER NOTE - ATTENDING CONTRIBUTION TO CARE
19 y F PMH gastric sleeve procedure performed in [place] 5 months ago, pw vomiting of even small amounts of liquid worsening x 2-3 days. Had same episode of symptoms 3 wks ago with CT and labs all negative, save for UA + for UTI. Tolerated PO and discharged on PO macrobid which she finished. No fever, chills, abd pain, diarrhea, obstipation, constipation. Would like to avoid CT today and requesting water to trial PO.  Exam: NAD, NCAT, HEENT: mmm, EOMI, PERRLA, Neck: supple, nontender, nl ROM, Heart: RRR, no murmur, Lungs: BCTA, no signs of increased WOB, Abd: NTND, no guarding or rebound, no hernia palpated, no CVAT. MSK: chest, back, and ext nontender, nl rom, no deformity. Neuro: A&Ox3, normal strength, nl sensation throughout, normal speech.  A/P: Eval for acute electrolyte abnormality, less likely obstructive process given prior CT only a few wks ago with identical symptoms and no abd pain or ttp. Eval also for hepatobiliary or pancreatic process. Less likely infectious process. Eval for UTI. Labs, fluids, symptom control, reassess.

## 2019-08-19 NOTE — ED ADULT NURSE NOTE - NSFALLRSKPASTHIST_ED_ALL_ED
[id number] is requesting a rx for glucose blood VI test strips (FREESTYLE INSULINX) strip
no

## 2019-08-19 NOTE — ED PROVIDER NOTE - NSFOLLOWUPINSTRUCTIONS_ED_ALL_ED_FT
Nausea / Vomiting    Nausea is the feeling that you have an upset stomach or have to vomit. As nausea gets worse, it can lead to vomiting. Vomiting occurs when stomach contents are thrown up and out of the mouth which puts you at an increased risk for dehydration. Older adults and people with other diseases or a weak immune system are at higher risk for dehydration. Drink clear fluids in small but frequent amounts as tolerated. Eat bland, easy-to-digest foods in small amounts as tolerated.     SEEK IMMEDIATE MEDICAL CARE IF YOU HAVE THE FOLLOWING SYMPTOMS: fever, inability to keep fluids down, black or bloody vomitus, black or bloody stools, lightheadedness/dizziness, chest pain, severe headache, rash, shortness of breath, cold or clammy skin, confusion, pain with urination, or any signs of dehydration.

## 2019-08-19 NOTE — ED PROVIDER NOTE - PROVIDER TOKENS
PROVIDER:[TOKEN:[[id number]],FOLLOWUP:[1-3 Days]],PROVIDER:[TOKEN:[[id number]],FOLLOWUP:[1-3 Days]]

## 2019-08-19 NOTE — CONSULT NOTE ADULT - ASSESSMENT
19F with sleeve gastrectomy approximately 6 months ago, presents with intractable vomiting    Plan:  NPO  CT with IV and PO contrast  IVF  PPI, Carafate  ABx - rx of UTI  Avoid NSAIDS  Outpatient GI evaluation for EGD    Plan discussed with Dr. Samayoa

## 2019-08-19 NOTE — ED PROVIDER NOTE - PROGRESS NOTE DETAILS
Patient re-evaluated. Awaiting patient to provide urine sample. Vomiting improved after meds. Also awaiting Protonix. Patient still with dry wretching and not feeling like she can hold any liquids down. Will place NG tube, consult surgery, give oral contrast through NG, and do CT scan. Spoke with surgery, Dr. Malloy, will evaluate patient. Agree with plan for NG tube and oral/IV contrast study. Patient would like to try to drink oral contrast as opposed to NG tube after receiving Haldol. N/v improved. Signout received from Dr. Turpin, will reasses & f/u ct Patient evaluated and cleared by surgery. Patient tolerated po feeling better requesting to go home.

## 2019-08-19 NOTE — ED PROVIDER NOTE - CLINICAL SUMMARY MEDICAL DECISION MAKING FREE TEXT BOX
Case endorsed to me by Dr. Parr -- patient with recent bariatric surgery in [place] presented to ED for 2nd episode of 2-3 days of intractable vomiting -- at time of my assessment sx had improved with haldol, patient was pending CT scan and surgical assessment. CT does not demonstrate any acute findings, per surgery no acute surgical intervention.     The patient has been able to tolerate PO in ED. Will continue maalox, will be discharged with outpatient GI follow up for EGD and with continued surgical follow up.     Patient advised on slowly resuming diet, starting with liquids. Need for close follow up and return precautions.

## 2019-08-19 NOTE — ED PROVIDER NOTE - CARE PROVIDER_API CALL
Junior Liu)  Gastroenterology  [address] [place] [address]  Phone: [phone]  Fax: [phone]  Follow Up Time: 1-3 Days    Kimmie Samayoa)  [facility]  [address] [place] [id number]  Phone: [phone]  Fax: [phone]  Follow Up Time: 1-3 Days

## 2019-08-19 NOTE — CONSULT NOTE ADULT - SUBJECTIVE AND OBJECTIVE BOX
MOHSEN, ALAA [id number]  19y Female with history of sleeve gastrectomy approximately 6months ago in [place] presents to day with reported intractible vomiting. Patient was seen for similar complaints on  however has not followed up since that time and has been non compliant with medications. States she was eating less than the reccomended baritric diet at the start of her post operative course and now cannot tolerate any PO intake. Regardless of what she attempts to ingest results in vomiting. never has had colonoscopy or endoscopy. continues to pass gas, last bm approximately 1 week ago was very small, denies smoking, uses NSAIDs for pain control       PAST MEDICAL & SURGICAL HISTORY:  No pertinent past medical history  S/P laparoscopic sleeve gastrectomy        MEDICATIONS  (STANDING):    MEDICATIONS  (PRN):      Allergies    penicillin (Unknown)    Intolerances        REVIEW OF SYSTEMS    [X] A ten-point review of systems was otherwise negative except as noted.  [ ] Due to altered mental status/intubation, subjective information were not able to be obtained from the patient. History was obtained, to the extent possible, from review of the chart and collateral sources of information.      Vital Signs Last 24 Hrs  T(C): 36.8 (19 Aug 2019 14:37), Max: 36.8 (19 Aug 2019 14:37)  T(F): 98.3 (19 Aug 2019 14:37), Max: 98.3 (19 Aug 2019 14:37)  HR: 50 (19 Aug 2019 14:37) (50 - 50)  BP: 148/67 (19 Aug 2019 14:37) (148/67 - 148/67)  BP(mean): --  RR: 18 (19 Aug 2019 14:37) (18 - 18)  SpO2: 97% (19 Aug 2019 14:37) (97% - 97%)    PHYSICAL EXAM:  GENERAL: NAD, well-appearing  CHEST/LUNG: Clear to auscultation bilaterally  HEART: Regular rate and rhythm  ABDOMEN: Soft, Nontender, Nondistended;   EXTREMITIES:  No clubbing, cyanosis, or edema      LABS:  Labs:  CAPILLARY BLOOD GLUCOSE                              14.0   9.80  )-----------( 222      ( 19 Aug 2019 16:35 )             42.9       Auto Neutrophil %: 76.4 % (19 @ 16:35)  Auto Immature Granulocyte %: 0.8 % (19 @ 16:35)        142  |  102  |  6<L>  ----------------------------<  98  4.2   |  24  |  0.6<L>      Calcium, Total Serum: 9.7 mg/dL (19 @ 16:35)      LFTs:             7.0  | 0.4  | 20       ------------------[64      ( 19 Aug 2019 16:35 )  4.3  | x    | 25          Lipase:9      Amylase:x         Lactate, Blood: 1.0 mmol/L (19 @ 16:35)      Coags:            Urinalysis Basic - ( 19 Aug 2019 19:34 )    Color: Suzan / Appearance: Turbid / S.038 / pH: x  Gluc: x / Ketone: Large  / Bili: Moderate / Urobili: 6 mg/dL   Blood: x / Protein: 100 mg/dL / Nitrite: Negative   Leuk Esterase: Negative / RBC: 2 /HPF / WBC 10 /HPF   Sq Epi: x / Non Sq Epi: >27 /HPF / Bacteria: Few            RADIOLOGY & ADDITIONAL STUDIES:

## 2019-08-19 NOTE — ED ADULT NURSE NOTE - OBJECTIVE STATEMENT
Pt c/o abdominal pain, intractable vomiting and unable to eat x 3 days. Pt c/o abdominal pain, intractable vomiting, burning in throat and unable to eat x 3 days.

## 2019-08-19 NOTE — ED PROVIDER NOTE - OBJECTIVE STATEMENT
19y F with PMH of gastric sleeve done about 4-5 months ago in [place] presenting with intractable vomiting x 2-3 days even with small sips of liquid. Denies any hematemesis, f/c, abd pain, back pain, urinary sxs, diarrhea, constipation, or injuries/traumas/falls. Does endorse burning in throat likely 2/2 intractable vomiting. Was seen here in ED 07/30/19, had CT and labs done which were negative, diagnosed with UTI and given Macrobid (finished course). Was told to f/u with GI outpatient for EGD study, did not follow-up. Would like to avoid CT today and requesting water to trial PO. Normal for race

## 2019-08-19 NOTE — ED PROVIDER NOTE - CARE PROVIDERS DIRECT ADDRESSES
[email or web address]

## 2019-08-19 NOTE — ED PROVIDER NOTE - PHYSICAL EXAMINATION
PHYSICAL EXAM: I have reviewed current vital signs.  GENERAL: NAD, well-nourished; well-developed.  HEAD:  Normocephalic, atraumatic.  EYES: Conjunctiva and sclera clear.  ENT: MMM, no erythema/exudates.  NECK: Supple, full ROM.  CHEST/LUNG: Clear to auscultation bilaterally; no wheezes, rales, or rhonchi.  HEART: Regular rate and rhythm, normal S1 and S2; no murmurs, rubs, or gallops.  ABDOMEN: Soft, nontender, nondistended.  EXTREMITIES:  2+ peripheral pulses; FROM.  PSYCH: Cooperative, appropriate, normal mood and affect.  NEUROLOGY: A&O x 3. Motor 5/5. No focal neurological deficits.   SKIN: Warm and dry.

## 2019-08-19 NOTE — ED PROVIDER NOTE - NS ED ROS FT
Constitutional:  No fevers or chills.  Eyes:  No visual changes, eye pain, or discharge.  ENT:  No hearing changes. +Burning in throat.  Neck:  No neck pain.  Cardiac:  No CP or edema.  Resp:  No cough or SOB. No hemoptysis.   GI:  +N/v. No diarrhea or abdominal pain.  :  No dysuria, frequency, or hematuria.  MSK:  No myalgias or joint pain/swelling.  Neuro:  No headache, dizziness, or weakness.  Skin:  No skin rash.

## 2019-08-20 PROBLEM — Z00.00 ENCOUNTER FOR PREVENTIVE HEALTH EXAMINATION: Status: ACTIVE | Noted: 2019-08-20

## 2019-08-20 PROCEDURE — 74177 CT ABD & PELVIS W/CONTRAST: CPT | Mod: 26

## 2019-08-21 LAB
CULTURE RESULTS: SIGNIFICANT CHANGE UP
SPECIMEN SOURCE: SIGNIFICANT CHANGE UP

## 2019-12-03 ENCOUNTER — APPOINTMENT (OUTPATIENT)
Dept: GASTROENTEROLOGY | Facility: CLINIC | Age: 19
End: 2019-12-03

## 2022-03-23 ENCOUNTER — APPOINTMENT (OUTPATIENT)
Dept: UROGYNECOLOGY | Facility: CLINIC | Age: 22
End: 2022-03-23

## 2022-08-22 ENCOUNTER — EMERGENCY (EMERGENCY)
Facility: HOSPITAL | Age: 22
LOS: 0 days | Discharge: HOME | End: 2022-08-22
Attending: EMERGENCY MEDICINE | Admitting: EMERGENCY MEDICINE

## 2022-08-22 VITALS
SYSTOLIC BLOOD PRESSURE: 107 MMHG | DIASTOLIC BLOOD PRESSURE: 61 MMHG | HEIGHT: 68 IN | OXYGEN SATURATION: 98 % | HEART RATE: 78 BPM | WEIGHT: 179.9 LBS | RESPIRATION RATE: 18 BRPM | TEMPERATURE: 96 F

## 2022-08-22 DIAGNOSIS — S09.90XA UNSPECIFIED INJURY OF HEAD, INITIAL ENCOUNTER: ICD-10-CM

## 2022-08-22 DIAGNOSIS — Z98.84 BARIATRIC SURGERY STATUS: Chronic | ICD-10-CM
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
## 2022-08-22 DIAGNOSIS — Z88.0 ALLERGY STATUS TO PENICILLIN: ICD-10-CM
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
## 2022-08-22 PROCEDURE — 99283 EMERGENCY DEPT VISIT LOW MDM: CPT

## 2022-08-22 NOTE — ED PROVIDER NOTE - OBJECTIVE STATEMENT
22-year-old female denies significant PMH presents status post fall times several stairs today approximately 3 to 4 hours prior to arrival, states slipped and fell times approximately 6 stairs, hit the front of her head, reports pain to same area, persistent, denies modifying factors, denies LOC, vomiting,  paresthesias, other injuries or other complaints at present.     Old chart reviewed.  I have reviewed and agree with the initial nursing note, except as documented in my note.

## 2022-08-22 NOTE — ED PROVIDER NOTE - ENMT, MLM
Soft tissue contusion and slight abrasion to left frontal region, otherwise Head NC / NT. Airway patent, Nasal mucosa clear. Mouth with normal mucosa. Throat has no vesicles, no oropharyngeal exudates and uvula is midline.

## 2022-08-22 NOTE — ED PROVIDER NOTE - NSFOLLOWUPINSTRUCTIONS_ED_ALL_ED_FT
Follow up with your doctor in one day.  If you do not have a doctor, CALL [phone] to find a physician to follow up with.      Head Injury, Adult       There are many types of head injuries. Head injuries can be as minor as a small bump, or they can be a serious medical issue. More severe head injuries include:  •A jarring injury to the brain (concussion).      •A bruise (contusion) of the brain. This means there is bleeding in the brain that can cause swelling.      •A cracked skull (skull fracture).      •Bleeding in the brain that collects, clots, and forms a bump (hematoma).      After a head injury, most problems occur within the first 24 hours, but side effects may occur up to 7–10 days after the injury. It is important to watch your condition for any changes. You may need to be observed in the emergency department or urgent care, or you may be admitted to the hospital.      What are the causes?    There are many possible causes of a head injury. Serious head injuries may be caused by car accidents, bicycle or motorcycle accidents, sports injuries, falls, or being struck by an object.      What are the symptoms?    Symptoms of a head injury include a contusion, bump, or bleeding at the site of the injury. Other physical symptoms may include:  •Headache.      •Nausea or vomiting.      •Dizziness.      •Blurred or double vision.      •Being uncomfortable around bright lights or loud noises.      •Seizures.      •Feeling tired.      •Trouble being awakened.      •Loss of consciousness.      Mental or emotional symptoms may include:  •Irritability.      •Confusion and memory problems.      •Poor attention and concentration.      •Changes in eating or sleeping habits.      •Anxiety or depression.        How is this diagnosed?    This condition can usually be diagnosed based on your symptoms, a description of the injury, and a physical exam. You may also have imaging tests done, such as a CT scan or an MRI.      How is this treated?    Treatment for this condition depends on the severity and type of injury you have. The main goal of treatment is to prevent complications and allow the brain time to heal.    Mild head injury     If you have a mild head injury, you may be sent home, and treatment may include:  •Observation. A responsible adult should stay with you for 24 hours after your injury and check on you often.      •Physical rest.      •Brain rest.      •Pain medicines.      Severe head injury    If you have a severe head injury, treatment may include:•Close observation. This includes hospitalization with the following care:  •Frequent physical exams.      •Frequent checks of how your brain and nervous system are working (neurological status).      •Checking your blood pressure and oxygen levels.        •Medicines to relieve pain, prevent seizures, and decrease brain swelling.      •Airway protection and breathing support. This may include using a ventilator.      •Treatments that monitor and manage swelling inside the brain.    •Brain surgery. This may be needed to:  •Remove a collection of blood or blood clots.      •Stop the bleeding.      •Remove a part of the skull to allow room for the brain to swell.          Follow these instructions at home:    Activity     •Rest and avoid activities that are physically hard or tiring.      •Make sure you get enough sleep.    •Let your brain rest by limiting activities that require a lot of thought or attention, such as:  •Watching TV.      •Playing memory games and puzzles.      •Job-related work or homework.      •Working on the computer, using social media, and texting.        •Avoid activities that could cause another head injury, such as playing sports, until your health care provider approves. Having another head injury, especially before the first one has healed, can be dangerous.      •Ask your health care provider when it is safe for you to return to your regular activities, including work or school. Ask your health care provider for a step-by-step plan for gradually returning to activities.      •Ask your health care provider when you can drive, ride a bicycle, or use heavy machinery. Your ability to react may be slower after a brain injury. Do not do these activities if you are dizzy.        Lifestyle      • Do not drink alcohol until your health care provider approves. Do not use drugs. Alcohol and certain drugs may slow your recovery and can put you at risk of further injury.      •If it is harder than usual to remember things, write them down.      •If you are easily distracted, try to do one thing at a time.      •Talk with family members or close friends when making important decisions.      •Tell your friends, family, a trusted colleague, and  about your injury, symptoms, and restrictions. Have them watch for any new or worsening problems.      General instructions     •Take over-the-counter and prescription medicines only as told by your health care provider.      •Have someone stay with you for 24 hours after your head injury. This person should watch you for any changes in your symptoms and be ready to seek medical help.      •Keep all follow-up visits as told by your health care provider. This is important.        How is this prevented?    •Work on improving your balance and strength to avoid falls.      •Wear a seat belt when you are in a moving vehicle.      •Wear a helmet when riding a bicycle, skiing, or doing any other sport or activity that has a risk of injury.    •If you drink alcohol:•Limit how much you use to:  •0–1 drink a day for nonpregnant women.      •0–2 drinks a day for men.        •Be aware of how much alcohol is in your drink. In the [place] one drink equals one 12 oz bottle of beer (355 mL), one 5 oz glass of wine (148 mL), or one 1½ oz glass of hard liquor (44 mL).      •Take safety measures in your home, such as:  •Removing clutter and tripping hazards from floors and stairways.      •Using grab bars in bathrooms and handrails by stairs.      •Placing non-slip mats on floors and in bathtubs.      •Improving lighting in dim areas.          Where to find more information    •Centers for Disease Control and Prevention: www.cdc.gov        Get help right away if:  •You have:  •A severe headache that is not helped by medicine.      •Trouble walking or weakness in your arms and legs.      •Clear or bloody fluid coming from your nose or ears.      •Changes in your vision.      •A seizure.      •Increased confusion or irritability.        •Your symptoms get worse.      •You are sleepier than normal and have trouble staying awake.      •You lose your balance.      •Your pupils change size.      •Your speech is slurred.      •Your dizziness gets worse.      •You vomit.      These symptoms may represent a serious problem that is an emergency. Do not wait to see if the symptoms will go away. Get medical help right away. Call your local emergency services (911 in the [place] Do not drive yourself to the hospital.       Summary    •Head injuries can be minor, or they can be a serious medical issue requiring immediate attention.      •Treatment for this condition depends on the severity and type of injury you have.      •Have someone stay with you for 24 hours after your injury and check on you often.      •Ask your health care provider when it is safe for you to return to your regular activities, including work or school.      •Head injury prevention includes wearing a seat belt in a motor vehicle, using a helmet on a bicycle, limiting alcohol use, and taking safety measures in your home.

## 2022-08-22 NOTE — ED PROVIDER NOTE - PROGRESS NOTE DETAILS
BH: return precautions discussed, friend at bedside is a nurse who will observe patient and return to ED for any concerns.

## 2022-08-22 NOTE — ED PROVIDER NOTE - PATIENT PORTAL LINK FT
You can access the FollowMyHealth Patient Portal offered by [facility] by registering at the following website: [email or web address]. By joining [employer]’s FollowMyHealth portal, you will also be able to view your health information using other applications (apps) compatible with our system.

## 2022-08-22 NOTE — ED PROVIDER NOTE - ATTENDING APP SHARED VISIT CONTRIBUTION OF CARE
22-year-old female denies significant PMH presents status post fall times several stairs today approximately 3 to 4 hours prior to arrival, states slipped and fell times approximately 6 stairs, hit the front of her head, reports pain to same area, persistent, denies modifying factors, denies LOC, vomiting,  paresthesias, other injuries or other complaints at present.     Old chart reviewed.  I have reviewed and agree with the initial nursing note, except as documented in my note.    VSS, awake, alert, non-toxic appearing, soft tissue contusion and slight abrasion to left frontal region, otherwise Head NC / NT, PERRL / EOMI, no hemotympanum, no dental injury, no lacerations, alert and oriented to person, place and time, clear speech, gait normal.

## 2022-08-22 NOTE — ED PROVIDER NOTE - NS ED ATTENDING STATEMENT MOD
This was a shared visit with the BEAU. I reviewed and verified the documentation and independently performed the documented:

## 2022-09-02 ENCOUNTER — EMERGENCY (EMERGENCY)
Facility: HOSPITAL | Age: 22
LOS: 0 days | Discharge: HOME | End: 2022-09-02
Attending: EMERGENCY MEDICINE | Admitting: EMERGENCY MEDICINE

## 2022-09-02 VITALS
DIASTOLIC BLOOD PRESSURE: 87 MMHG | OXYGEN SATURATION: 98 % | HEIGHT: 68 IN | TEMPERATURE: 97 F | SYSTOLIC BLOOD PRESSURE: 134 MMHG | RESPIRATION RATE: 20 BRPM | HEART RATE: 78 BPM

## 2022-09-02 DIAGNOSIS — M54.50 LOW BACK PAIN, UNSPECIFIED: ICD-10-CM

## 2022-09-02 DIAGNOSIS — S61.101A UNSPECIFIED OPEN WOUND OF RIGHT THUMB WITH DAMAGE TO NAIL, INITIAL ENCOUNTER: ICD-10-CM
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
## 2022-09-02 DIAGNOSIS — Z98.84 BARIATRIC SURGERY STATUS: Chronic | ICD-10-CM
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
## 2022-09-02 DIAGNOSIS — Z98.84 BARIATRIC SURGERY STATUS: ICD-10-CM

## 2022-09-02 DIAGNOSIS — Z88.0 ALLERGY STATUS TO PENICILLIN: ICD-10-CM

## 2022-09-02 PROCEDURE — 71046 X-RAY EXAM CHEST 2 VIEWS: CPT | Mod: 26

## 2022-09-02 PROCEDURE — 72170 X-RAY EXAM OF PELVIS: CPT | Mod: 26

## 2022-09-02 PROCEDURE — 64450 NJX AA&/STRD OTHER PN/BRANCH: CPT

## 2022-09-02 PROCEDURE — 99284 EMERGENCY DEPT VISIT MOD MDM: CPT | Mod: 25

## 2022-09-02 RX ORDER — IBUPROFEN 200 MG
600 TABLET ORAL ONCE
Refills: 0 | Status: COMPLETED | OUTPATIENT
Start: 2022-09-02 | End: 2022-09-02

## 2022-09-02 RX ORDER — IBUPROFEN 200 MG
1 TABLET ORAL
Qty: 20 | Refills: 0
Start: 2022-09-02 | End: 2022-09-06

## 2022-09-02 RX ORDER — ACETAMINOPHEN 500 MG
2 TABLET ORAL
Qty: 40 | Refills: 0
Start: 2022-09-02 | End: 2022-09-06

## 2022-09-02 RX ADMIN — Medication 600 MILLIGRAM(S): at 19:50

## 2022-09-02 NOTE — ED PROVIDER NOTE - ATTENDING CONTRIBUTION TO CARE
Bilateral  osteitis condensans ilei. Bilateral femoral pinning. 23 y/o f w/ past medical history of bilateral  osteitis condensans ilei s/p bilateral femoral pinning presents for right lower back pain after she tripped and fell in a store hitting her lower back, no head trauma or LOC around 4 PM.  Patient reports throbbing right sided lower back pain, constant, nonradiating, mild intensity, worse with movement, better at rest, dysuria did not take anything for the pain.  Last menstrual period 3 weeks ago.  Patient also asking to have right according to Nesha mood as she fentanyl backwards and it is partially off her finger, states has had this happen for days and had the nail removed.  Patient reports no pain or discomfort no signs of infection he is having notes from due to nail partially already off finger would like the rest removed. No fever, chills, n/v, cp,  pleuritic cp, sob, palpitations, diaphoresis, cough, ha/lh/dizziness, numbness/tingling, neck pain/ stiffness, abd pain, diarrhea, constipation, melena/brbpr, urinary symptoms, saddle paresthesias, urinary or bowel incontinence, weakness, edema, calf pain/swelling/erythema, sick contacts, recent travel or rash. No IVDA. GCS 15.     On Exam:   Vital Signs: I have reviewed the initial vital signs. Constitutional: Non toxic appearing pt sitting on stretcher speaking full sentences. Integumentary: No rash. ENT: MMM NECK: Supple, non-tender, no meningeal signs. Cardiovascular: RRR, radial pulses 2/4 b/l. dp and pt pulses 2/4 b/l. No JVD. Respiratory: BS present b/l, ctabl, no wheezing or crackles, no accessory muscle use, no stridor. Gastrointestinal: BS present throughout all 4 quadrants, soft, nd, nt, no rebound tenderness or guarding, no cvat. Musculoskeletal: R first finger nail with acrylic nail partially off nail bed , no swelling and erythema, no fluctuance no induration. no paronychia. No lacerations, abrasions, or ecchymoses. No crepitus. Radial pulses 2/4 b/l. Cap refill < 2 seconds, No obvious bony deformity. Good finger opposition, FROM of R wrist in flexion, extension, ulna and radial deviation. No snuff box tenderness.    FROM of R first finger in flexion, extension at PIP, DIP and MCP, no pain to palpation to finger pad, no pain to palpation along tendon sheath, finger not held in flexion, no fusiform swelling, no pain with passive extension FROM, R lumbar paraspina pain to palpation , no spinous ttp, no palpable shelves or step-offs, (-) SLR b/l, no foot drop, FROM with no pain to hip or back, no edema, no calf pain/swelling/erythema. No hip pain to palpation, no short leg, no internal or external rotation of LE. Neurologic: AAOx3, motor 5/5 and sensation intact throughout upper and lower ext, CN II-XII intact, No facial droop or slurring of speech. No focal deficits. 2+ patella and achilles pulses. 23 y/o f w/ past medical history of bilateral  osteitis condensans ilei s/p bilateral femoral pinning presents for right lower back pain after she tripped and fell in a store hitting her lower back, no head trauma or LOC around 4 PM.  Patient reports throbbing right sided lower back pain, constant, nonradiating, mild intensity, worse with movement, better at rest, dysuria did not take anything for the pain.  Last menstrual period 3 weeks ago.  Patient also asking to have R acrylic + finger nail removed as she fell backwards on it a week ago and it is partially fell off her finger, states has had this happened before and she had the nail removed.  Patient reports no pain or discomfort no signs of infection. No fever, chills, n/v, cp,  pleuritic cp, sob, palpitations, diaphoresis, cough, ha/lh/dizziness, numbness/tingling, neck pain/ stiffness, abd pain, diarrhea, constipation, melena/brbpr, urinary symptoms, saddle paresthesias, urinary or bowel incontinence, weakness, edema, calf pain/swelling/erythema, sick contacts, recent travel or rash. No IVDA. GCS 15.     On Exam:   Vital Signs: I have reviewed the initial vital signs. Constitutional: Non toxic appearing pt sitting on stretcher speaking full sentences. Integumentary: No rash.   ENT: MMM NECK: Supple, non-tender, no meningeal signs. Cardiovascular: RRR, radial pulses 2/4 b/l. dp and pt pulses 2/4 b/l. No JVD. Respiratory: BS present b/l, ctabl, no wheezing or crackles, no accessory muscle use, no stridor. Gastrointestinal: BS present throughout all 4 quadrants, soft, nd, nt, no rebound tenderness or guarding, no cvat. Musculoskeletal: R first finger nail with acrylic nail partially off nail bed , no swelling and erythema, no fluctuance no induration. no paronychia. No lacerations, abrasions, or ecchymoses. No crepitus. Radial pulses 2/4 b/l. Cap refill < 2 seconds, No obvious bony deformity. Good finger opposition, FROM of R wrist in flexion, extension, ulna and radial deviation. No snuff box tenderness.    FROM of R first finger in flexion, extension at PIP, DIP and MCP, no pain to palpation to finger pad, no pain to palpation along tendon sheath, finger not held in flexion, no fusiform swelling, no pain with passive extension FROM, R lumbar paraspina pain to palpation , no spinous ttp, no palpable shelves or step-offs, (-) SLR b/l, no foot drop, FROM with no pain to hip or back, no edema, no calf pain/swelling/erythema. No hip pain to palpation, no short leg, no internal or external rotation of LE. Neurologic: AAOx3, motor 5/5 and sensation intact throughout upper and lower ext, CN II-XII intact, No facial droop or slurring of speech. No focal deficits. 2+ patella and achilles pulses.

## 2022-09-02 NOTE — ED PROVIDER NOTE - OBJECTIVE STATEMENT
22 year old female presenting to the ED for lower back pain s/p mechanical trip and fall at a store. denies any head trauma, did not lose consciousness, not on any blood thinners. states that the pain is worse w/ movement. did not take anything at home for the pain. also states that she fell and hit R thumb fingernail 1 week ago w/ acrylic nail attached, R thumb fingernail is partially detached. no active bleeding from the thumb.

## 2022-09-02 NOTE — ED ADULT NURSE NOTE - NSIMPLEMENTINTERV_GEN_ALL_ED
Implemented All Fall Risk Interventions:  [place] to call system. Call bell, personal items and telephone within reach. Instruct patient to call for assistance. Room bathroom lighting operational. Non-slip footwear when patient is off stretcher. Physically safe environment: no spills, clutter or unnecessary equipment. Stretcher in lowest position, wheels locked, appropriate side rails in place. Provide visual cue, wrist band, yellow gown, etc. Monitor gait and stability. Monitor for mental status changes and reorient to person, place, and time. Review medications for side effects contributing to fall risk. Reinforce activity limits and safety measures with patient and family.

## 2022-09-02 NOTE — ED PROVIDER NOTE - CARE PROVIDERS DIRECT ADDRESSES
[email or web address]

## 2022-09-02 NOTE — ED PROVIDER NOTE - CARE PROVIDER_API CALL
Luna Riddle (MD)  Orthopaedic Surgery; Surgery of the Hand  [address] [place] [address]  Phone: [phone]  Fax: [phone]  Follow Up Time: 4-6 Days    Demarcus Paulson)  Orthopaedic Surgery  [address] [place] [address]  Phone: [phone]  Fax: [phone]  Follow Up Time: 4-6 Days    Apollo Torres  PLASTIC SURGERY  [address] [place] [address]  Phone: [phone]  Fax: [phone]  Follow Up Time:

## 2022-09-02 NOTE — ED PROVIDER NOTE - PHYSICAL EXAMINATION
CONSTITUTIONAL: Well-developed; well-nourished; in no acute distress.   SKIN: warm, dry. no rashes.  HEAD: Normocephalic; atraumatic.  EYES: PERRLA, EOMI, no conjunctival erythema.  ENT: No nasal discharge; airway clear. mmm.  NECK: Supple; non tender. no c-spine ttp.  CARD: S1, S2 normal; no murmurs, gallops, or rubs. Regular rate and rhythm.   RESP: No wheezes, rales or rhonchi. lungs ctab.  ABD: soft ntnd; no masses, rebound, or guarding.  MSK/EXT: Normal ROM.  No clubbing, cyanosis or edema. No midline spinal tenderness to palpation. (+) R lumbar paraspinal ttp. (+) R thumb fingernail is partially avulsed, no active bleeding noted from thumb. No ttp of R thumb.  NEURO: Alert, oriented, grossly unremarkable. CNs 2-12 grossly intact. normal motor, strength, and sensation. no focal neuro. deficits.  PSYCH: Cooperative, appropriate.

## 2022-09-02 NOTE — ED PROVIDER NOTE - NSFOLLOWUPCLINICSTOKEN_GEN_ALL_ED_FT
[id number]: || ||00\01||False; [id number]: || ||00\01||False;[id number]:1-3 Days|| ||00\01||False;439842:1-3 Days|| ||00\01||False;

## 2022-09-02 NOTE — ED PROVIDER NOTE - PATIENT PORTAL LINK FT
You can access the FollowMyHealth Patient Portal offered by [facility] by registering at the following website: [email or web address]. By joining [facility]’s FollowMyHealth portal, you will also be able to view your health information using other applications (apps) compatible with our system.

## 2022-09-02 NOTE — ED PROVIDER NOTE - PROVIDER TOKENS
PROVIDER:[TOKEN:[[id number]],FOLLOWUP:[4-6 Days]],PROVIDER:[TOKEN:[[id number]],FOLLOWUP:[4-6 Days]],PROVIDER:[TOKEN:[[id number]]]

## 2022-09-02 NOTE — ED PROVIDER NOTE - PROGRESS NOTE DETAILS
ED Attending EUNICE Dorantes first finger acrylic nail removed, no complications, pt aware of proper wound care, nv intact. ED Attending EUNICE Dorantes  Pt did not want to wait for pelvic xray read, Pt aware that we will have official radiology read pending, and may result in change of xray read.  Pt voices understanding of use of medications, instructions for care, and reasons to return or to go to ED. pt ambulatory in ed, aware of proper wound care, follow up with pmd in [place], ortho, plastics, signs and symptoms to return for, comfortable and eager to go home, will follow up as discussed.

## 2022-09-02 NOTE — ED PROVIDER NOTE - NSFOLLOWUPCLINICS_GEN_ALL_ED_FT
[facility] ([facility])  Rehabilitation  [facility] [place] [address] [place] [address]  Phone: [phone]  Fax:      [facility] ([facility])  Rehabilitation  [facility] [place] [address], [address] [place] [address]  Phone: [phone]  Fax:     [facility]  Physical Therapy  Multiple Location  [place]   Phone: [phone]  Fax:   Follow Up Time: 1-3 Days    Pain Management Physician  Pain Management  .  [place]   Phone:   Fax:   Follow Up Time: 1-3 Days

## 2022-09-02 NOTE — ED PROCEDURE NOTE - GENERAL PROCEDURE DETAILS
removed L thumb fingernail (with acrylic nail attached) removed R thumb fingernail (with acrylic nail attached)

## 2022-09-02 NOTE — ED PROVIDER NOTE - NSFOLLOWUPINSTRUCTIONS_ED_ALL_ED_FT
Back Pain    Back pain is very common in adults. The cause of back pain is rarely dangerous and the pain often gets better over time. The cause of your back pain may not be known and may include strain of muscles or ligaments, degeneration of the spinal disks, or arthritis. Occasionally the pain may radiate down your leg(s). Over-the-counter medicines to reduce pain and inflammation are often the most helpful. Stretching and remaining active frequently helps the healing process.     SEEK IMMEDIATE MEDICAL CARE IF YOU HAVE THE FOLLOWING SYMPTOMS: bowel or bladder control problems, unusual weakness or numbness in your arms or legs, nausea or vomiting, abdominal pain, fever, dizziness/lightheadedness.       Nail Avulsion    WHAT YOU NEED TO KNOW:    Nail avulsion is when part or all of a nail is torn away or removed from the nail bed. Avulsion may happen on your finger or toe. Common causes include ingrown nail, injury, or infection. The nail bed will form a hard layer and then a new nail may grow. The nail bed will be sensitive until the hard layer forms. You will need to keep it covered to prevent infection or more injury. You may need to care for your nail area for several months as the new nail grows.    DISCHARGE INSTRUCTIONS:    Return to the emergency department if:     Blood soaks through your bandage.      You have a red streak running up your leg or arm.    Contact your healthcare provider if:     You have a fever or chills.      Your injured area is red, swollen, or draining pus.       You have new or worsening pain, or pain that does not get better with medicine.      You have questions or concerns about your condition or care.    Medicines:     Antibiotics may help treat or prevent a bacterial infection.      Acetaminophen decreases pain and fever. It is available without a doctor's order. Ask how much to take and how often to take it. Follow directions. Acetaminophen can cause liver damage if not taken correctly.      NSAIDs, such as ibuprofen, help decrease swelling, pain, and fever. This medicine is available with or without a doctor's order. NSAIDs can cause stomach bleeding or kidney problems in certain people. If you take blood thinner medicine, always ask your healthcare provider if NSAIDs are safe for you. Always read the medicine label and follow directions.      Take your medicine as directed. Contact your healthcare provider if you think your medicine is not helping or if you have side effects. Tell him or her if you are allergic to any medicine. Keep a list of the medicines, vitamins, and herbs you take. Include the amounts, and when and why you take them. Bring the list or the pill bottles to follow-up visits. Carry your medicine list with you in case of an emergency.    Self-care:     Keep your nail area clean, dry, and covered. When you are allowed to bathe, carefully wash the area with soap and water. Put on a clean, new bandage. Do not use an adhesive bandage. It may stick to the wound and cause pain when you remove it. Ask your healthcare provider what kind of bandage to use. Change your bandage when it gets wet or dirty. Your healthcare provider may suggest that you change the bandage every 24 hours for the first few days.      Elevate your hand or foot above the level of your heart as often as you can for 24 hours. This will help decrease swelling and pain. Prop your hand or foot on pillows or blankets to keep it elevated comfortably.       Apply ice on your wound area for 15 to 20 minutes every hour or as directed. Use an ice pack, or put crushed ice in a plastic bag. Cover it with a towel. Ice helps prevent tissue damage and decreases swelling and pain.      Do not wear tight shoes or shoes that do not fit well. Do not wear tights or pantyhose. Wear cotton socks.      Ask when you can return to work, school, or your usual sports and activities.    Follow up with your healthcare provider as directed: You may be referred to a hand or foot specialist. Write down your questions so you remember to ask them during your visits.        © Copyright [employer] 2019 All illustrations and images included in CareNotes are the copyrighted property of [employer] Inc[employer] or [employer]. Our Emergency Department Referral Coordinators will be reaching out ot you in the next 24-48 hours from 9:00am to 5:00pm (Monday to Friday) with a follow up appointment. Please expect a phone call from the hospital in that time frame. If you do not receive a call or if you have any questions or concerns, you can reach them at [phone] or [phone].    Back Pain    Back pain is very common in adults. The cause of back pain is rarely dangerous and the pain often gets better over time. The cause of your back pain may not be known and may include strain of muscles or ligaments, degeneration of the spinal disks, or arthritis. Occasionally the pain may radiate down your leg(s). Over-the-counter medicines to reduce pain and inflammation are often the most helpful. Stretching and remaining active frequently helps the healing process.     SEEK IMMEDIATE MEDICAL CARE IF YOU HAVE THE FOLLOWING SYMPTOMS: bowel or bladder control problems, unusual weakness or numbness in your arms or legs, nausea or vomiting, abdominal pain, fever, dizziness/lightheadedness.       Nail Avulsion    WHAT YOU NEED TO KNOW:    Nail avulsion is when part or all of a nail is torn away or removed from the nail bed. Avulsion may happen on your finger or toe. Common causes include ingrown nail, injury, or infection. The nail bed will form a hard layer and then a new nail may grow. The nail bed will be sensitive until the hard layer forms. You will need to keep it covered to prevent infection or more injury. You may need to care for your nail area for several months as the new nail grows.    DISCHARGE INSTRUCTIONS:    Return to the emergency department if:     Blood soaks through your bandage.      You have a red streak running up your leg or arm.    Contact your healthcare provider if:     You have a fever or chills.      Your injured area is red, swollen, or draining pus.       You have new or worsening pain, or pain that does not get better with medicine.      You have questions or concerns about your condition or care.    Medicines:     Antibiotics may help treat or prevent a bacterial infection.      Acetaminophen decreases pain and fever. It is available without a doctor's order. Ask how much to take and how often to take it. Follow directions. Acetaminophen can cause liver damage if not taken correctly.      NSAIDs, such as ibuprofen, help decrease swelling, pain, and fever. This medicine is available with or without a doctor's order. NSAIDs can cause stomach bleeding or kidney problems in certain people. If you take blood thinner medicine, always ask your healthcare provider if NSAIDs are safe for you. Always read the medicine label and follow directions.      Take your medicine as directed. Contact your healthcare provider if you think your medicine is not helping or if you have side effects. Tell him or her if you are allergic to any medicine. Keep a list of the medicines, vitamins, and herbs you take. Include the amounts, and when and why you take them. Bring the list or the pill bottles to follow-up visits. Carry your medicine list with you in case of an emergency.    Self-care:     Keep your nail area clean, dry, and covered. When you are allowed to bathe, carefully wash the area with soap and water. Put on a clean, new bandage. Do not use an adhesive bandage. It may stick to the wound and cause pain when you remove it. Ask your healthcare provider what kind of bandage to use. Change your bandage when it gets wet or dirty. Your healthcare provider may suggest that you change the bandage every 24 hours for the first few days.      Elevate your hand or foot above the level of your heart as often as you can for 24 hours. This will help decrease swelling and pain. Prop your hand or foot on pillows or blankets to keep it elevated comfortably.       Apply ice on your wound area for 15 to 20 minutes every hour or as directed. Use an ice pack, or put crushed ice in a plastic bag. Cover it with a towel. Ice helps prevent tissue damage and decreases swelling and pain.      Do not wear tight shoes or shoes that do not fit well. Do not wear tights or pantyhose. Wear cotton socks.      Ask when you can return to work, school, or your usual sports and activities.    Follow up with your healthcare provider as directed: You may be referred to a hand or foot specialist. Write down your questions so you remember to ask them during your visits.        © Copyright [employer] 2019 All illustrations and images included in CareNotes are the copyrighted property of [employer] or [employer].

## 2023-06-05 ENCOUNTER — EMERGENCY (EMERGENCY)
Facility: HOSPITAL | Age: 23
LOS: 0 days | Discharge: HOME | End: 2023-06-06
Attending: EMERGENCY MEDICINE
Payer: MEDICAID

## 2023-06-05 VITALS
OXYGEN SATURATION: 100 % | RESPIRATION RATE: 18 BRPM | WEIGHT: 130.07 LBS | TEMPERATURE: 98 F | SYSTOLIC BLOOD PRESSURE: 112 MMHG | HEART RATE: 73 BPM | DIASTOLIC BLOOD PRESSURE: 60 MMHG
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
## 2023-06-05 DIAGNOSIS — Z98.84 BARIATRIC SURGERY STATUS: ICD-10-CM

## 2023-06-05 DIAGNOSIS — Z23 ENCOUNTER FOR IMMUNIZATION: ICD-10-CM

## 2023-06-05 DIAGNOSIS — Z88.0 ALLERGY STATUS TO PENICILLIN: ICD-10-CM

## 2023-06-05 DIAGNOSIS — S61.411A LACERATION WITHOUT FOREIGN BODY OF RIGHT HAND, INITIAL ENCOUNTER: ICD-10-CM

## 2023-06-05 DIAGNOSIS — Z98.84 BARIATRIC SURGERY STATUS: Chronic | ICD-10-CM

## 2023-06-05 DIAGNOSIS — S61.011A LACERATION WITHOUT FOREIGN BODY OF RIGHT THUMB WITHOUT DAMAGE TO NAIL, INITIAL ENCOUNTER: ICD-10-CM
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
## 2023-06-05 PROCEDURE — 99284 EMERGENCY DEPT VISIT MOD MDM: CPT | Mod: 25

## 2023-06-05 PROCEDURE — 99283 EMERGENCY DEPT VISIT LOW MDM: CPT | Mod: 25

## 2023-06-05 PROCEDURE — 12002 RPR S/N/AX/GEN/TRNK2.6-7.5CM: CPT

## 2023-06-05 PROCEDURE — 90715 TDAP VACCINE 7 YRS/> IM: CPT

## 2023-06-05 PROCEDURE — 90471 IMMUNIZATION ADMIN: CPT

## 2023-06-05 RX ORDER — TETANUS TOXOID, REDUCED DIPHTHERIA TOXOID AND ACELLULAR PERTUSSIS VACCINE, ADSORBED 5; 2.5; 8; 8; 2.5 [IU]/.5ML; [IU]/.5ML; UG/.5ML; UG/.5ML; UG/.5ML
0.5 SUSPENSION INTRAMUSCULAR ONCE
Refills: 0 | Status: COMPLETED | OUTPATIENT
Start: 2023-06-05 | End: 2023-06-05

## 2023-06-05 RX ADMIN — TETANUS TOXOID, REDUCED DIPHTHERIA TOXOID AND ACELLULAR PERTUSSIS VACCINE, ADSORBED 0.5 MILLILITER(S): 5; 2.5; 8; 8; 2.5 SUSPENSION INTRAMUSCULAR at 23:34

## 2023-06-05 NOTE — ED PROVIDER NOTE - DISCHARGE DATE
Discharge instructions reviewed with opportunity for questions provided. Pt vocalized understanding. Armband removed and shredded. Pt stable condition at time of discharge. 05-Jun-2023

## 2023-06-05 NOTE — ED PROVIDER NOTE - PATIENT PORTAL LINK FT
You can access the FollowMyHealth Patient Portal offered by [facility] by registering at the following website: [email or web address]. By joining [employer]’s FollowMyHealth portal, you will also be able to view your health information using other applications (apps) compatible with our system.

## 2023-06-05 NOTE — ED PROVIDER NOTE - NSFOLLOWUPINSTRUCTIONS_ED_ALL_ED_FT
RETURN FOR SUTURE REMOVAL IN 10 DAYS  KEEP DRESSING ON AND WOUND CLEAN&DRY FOR 24 HRS.  AFTER THAT, GENTLY REMOVE DRESSING. WASH WITH SOAP&WATER, AVOID SCRUBBING. PAT DRY WELL AND RE-DRESS. REPEAT THIS ONCE OR TWICE DAILY UNTIL THE SUTURES ARE REMOVED.  RETURN IMMEDIATELY IF ANY SIGNS/SYMPTOMS OF INFECTION; INCLUDING BUT NOT LIMITED TO: DISCHARGE, FOUL SMELL, REDNESS, SWELLING, FEVER...      Laceration    WHAT YOU NEED TO KNOW:    A laceration is an injury to the skin and the soft tissue underneath it. Lacerations happen when you are cut or hit by something. They can happen anywhere on the body.     DISCHARGE INSTRUCTIONS:    Return to the emergency department if:     You have heavy bleeding or bleeding that does not stop after 10 minutes of holding firm, direct pressure over the wound.       Your wound opens up.     Contact your healthcare provider if:     You have a fever or chills.       Your laceration is red, warm, or swollen.      You have red streaks on your skin coming from your wound.      You have white or yellow drainage from the wound that smells bad.      You have pain that gets worse, even after treatment.       You have questions or concerns about your condition or care.     Medicines:     Prescription pain medicine may be given. Ask how to take this medicine safely.       Antibiotics help treat or prevent a bacterial infection.       Take your medicine as directed. Contact your healthcare provider if you think your medicine is not helping or if you have side effects. Tell him or her if you are allergic to any medicine. Keep a list of the medicines, vitamins, and herbs you take. Include the amounts, and when and why you take them. Bring the list or the pill bottles to follow-up visits. Carry your medicine list with you in case of an emergency.    Care for your wound as directed:     Do not get your wound wet until your healthcare provider says it is okay. Do not soak your wound in water. Do not go swimming until your healthcare provider says it is okay. Carefully wash the wound with soap and water. Gently pat the area dry or allow it to air dry.       Change your bandages when they get wet, dirty, or after washing. Apply new, clean bandages as directed. Do not apply elastic bandages or tape too tight. Do not put powders or lotions over your incision.       Apply antibiotic ointment as directed. Your healthcare provider may give you antibiotic ointment to put over your wound if you have stitches. If you have strips of tape over your incision, let them dry up and fall off on their own. If they do not fall off within 14 days, gently remove them. If you have glue over your wound, do not remove or pick at it. If your glue comes off, do not replace it with glue that you have at home.       Check your wound every day for signs of infection such as swelling, redness, or pus.     Self-care:     Apply ice on your wound for 15 to 20 minutes every hour or as directed. Use an ice pack, or put crushed ice in a plastic bag. Cover it with a towel. Ice helps prevent tissue damage and decreases swelling and pain.      Use a splint as directed. A splint will decrease movement and stress on your wound. It may help it heal faster. A splint may be used for lacerations over joints or areas of your body that bend. Ask your healthcare provider how to apply and remove a splint.       Decrease scarring of your wound by applying ointments as directed. Do not apply ointments until your healthcare provider says it is okay. You may need to wait until your wound is healed. Ask which ointment to buy and how often to use it. After your wound is healed, use sunscreen over the area when you are out in the sun. You should do this for at least 6 months to 1 year after your injury.     Follow up with your healthcare provider as directed: You may need to follow up in 24 to 48 hours to have your wound checked for infection. You will need to return in 3 to 14 days if you have stitches or staples so they can be removed. Care for your wound as directed to prevent infection and help it heal. Write down your questions so you remember to ask them during your visits.       © Copyright [employer] 2019 All illustrations and images included in CareNotes are the copyrighted property of [employer] or [employer]. RETURN FOR SUTURE REMOVAL IN 10 DAYS  KEEP DRESSING ON AND WOUND CLEAN & DRY FOR 24 HRS.  AFTER THAT, GENTLY REMOVE DRESSING. WASH WITH SOAP & WATER, AVOID SCRUBBING. PAT DRY WELL AND RE-DRESS. REPEAT THIS ONCE OR TWICE DAILY UNTIL THE SUTURES ARE REMOVED.  RETURN IMMEDIATELY IF ANY SIGNS/SYMPTOMS OF INFECTION; INCLUDING BUT NOT LIMITED TO: DISCHARGE, FOUL SMELL, REDNESS, SWELLING, FEVER...      Laceration    WHAT YOU NEED TO KNOW:    A laceration is an injury to the skin and the soft tissue underneath it. Lacerations happen when you are cut or hit by something. They can happen anywhere on the body.     DISCHARGE INSTRUCTIONS:    Return to the emergency department if:     You have heavy bleeding or bleeding that does not stop after 10 minutes of holding firm, direct pressure over the wound.       Your wound opens up.     Contact your healthcare provider if:     You have a fever or chills.       Your laceration is red, warm, or swollen.      You have red streaks on your skin coming from your wound.      You have white or yellow drainage from the wound that smells bad.      You have pain that gets worse, even after treatment.       You have questions or concerns about your condition or care.     Medicines:     Prescription pain medicine may be given. Ask how to take this medicine safely.       Antibiotics help treat or prevent a bacterial infection.       Take your medicine as directed. Contact your healthcare provider if you think your medicine is not helping or if you have side effects. Tell him or her if you are allergic to any medicine. Keep a list of the medicines, vitamins, and herbs you take. Include the amounts, and when and why you take them. Bring the list or the pill bottles to follow-up visits. Carry your medicine list with you in case of an emergency.    Care for your wound as directed:     Do not get your wound wet until your healthcare provider says it is okay. Do not soak your wound in water. Do not go swimming until your healthcare provider says it is okay. Carefully wash the wound with soap and water. Gently pat the area dry or allow it to air dry.       Change your bandages when they get wet, dirty, or after washing. Apply new, clean bandages as directed. Do not apply elastic bandages or tape too tight. Do not put powders or lotions over your incision.       Apply antibiotic ointment as directed. Your healthcare provider may give you antibiotic ointment to put over your wound if you have stitches. If you have strips of tape over your incision, let them dry up and fall off on their own. If they do not fall off within 14 days, gently remove them. If you have glue over your wound, do not remove or pick at it. If your glue comes off, do not replace it with glue that you have at home.       Check your wound every day for signs of infection such as swelling, redness, or pus.     Self-care:     Apply ice on your wound for 15 to 20 minutes every hour or as directed. Use an ice pack, or put crushed ice in a plastic bag. Cover it with a towel. Ice helps prevent tissue damage and decreases swelling and pain.      Use a splint as directed. A splint will decrease movement and stress on your wound. It may help it heal faster. A splint may be used for lacerations over joints or areas of your body that bend. Ask your healthcare provider how to apply and remove a splint.       Decrease scarring of your wound by applying ointments as directed. Do not apply ointments until your healthcare provider says it is okay. You may need to wait until your wound is healed. Ask which ointment to buy and how often to use it. After your wound is healed, use sunscreen over the area when you are out in the sun. You should do this for at least 6 months to 1 year after your injury.     Follow up with your healthcare provider as directed: You may need to follow up in 24 to 48 hours to have your wound checked for infection. You will need to return in 3 to 14 days if you have stitches or staples so they can be removed. Care for your wound as directed to prevent infection and help it heal. Write down your questions so you remember to ask them during your visits.       © Copyright [employer] 2019 All illustrations and images included in CareNotes are the copyrighted property of [employer] or [employer].

## 2023-06-05 NOTE — ED PROVIDER NOTE - PHYSICAL EXAMINATION
CONSTITUTIONAL: Well-appearing; well-nourished; in no apparent distress.   NECK: Supple; non-tender; no cervical lymphadenopathy.   CARDIOVASCULAR: Normal S1, S2; no murmurs, rubs, or gallops.   RESPIRATORY: Normal chest excursion with respiration; breath sounds clear and equal bilaterally; no wheezes, rhonchi, or rales.  GI/: Normal bowel sounds; non-distended; non-tender; no palpable organomegaly.   MS: Normal ROM in all four extremities; non-tender to palpation; distal pulses are normal.   SKIN: R hand superficial lac to palmar aspect and thumb; otherwise warm; dry; good turgor; no apparent lesions or exudate.   NEURO/PSYCH: A & O x 4; grossly unremarkable. mood and manner are appropriate. CONSTITUTIONAL: Well-appearing; well-nourished; in no apparent distress.   NECK: Supple; non-tender; no cervical lymphadenopathy.   CARDIOVASCULAR: Normal S1, S2; no murmurs, rubs, or gallops.   RESPIRATORY: Normal chest excursion with respiration; breath sounds clear and equal bilaterally; no wheezes, rhonchi, or rales.  GI/: Normal bowel sounds; non-distended; non-tender; no palpable organomegaly.   MS: Normal ROM in all four extremities; non-tender to palpation; distal pulses are normal.   SKIN: R hand superficial lac to palmar aspect of hand and to Rt thumb; otherwise warm; dry; good turgor; no apparent lesions or exudate. Neuro-vascular-motor exam intact  NEURO/PSYCH: A & O x 4; grossly unremarkable. mood and manner are appropriate.

## 2023-06-05 NOTE — ED PROVIDER NOTE - OBJECTIVE STATEMENT
pt presents to ED for eval/tx of R hand /thumb lac from broken glass, sts was pushing ice into her cup when it broke causing lac to palm of hand and thumb. unsure of last tdap. Denies fever/chill/HA/dizziness/chest pain/palpitation/sob/abd pain/n/v/d/ black stool/bloody stool/urinary sxs

## 2023-06-05 NOTE — ED PROVIDER NOTE - CLINICAL SUMMARY MEDICAL DECISION MAKING FREE TEXT BOX
23 yo F with no significant PMH presents to the ER for Rt hand laceration from broken glass today. Pt has a laceration to the right volar aspect of thumb, and another laceration to the proximal volar aspect of right hand (just above the wrist). Pt unsure of her tetanus status. Pt denies any other injury. No n/v/d/fever/chills/sob/cp/HA/purulent drainage. Pt has no weakness or numbness to hand.    laceration repaired, tetanus updated, wound care provided, and return instructions explained verbally and written.

## 2023-06-05 NOTE — ED PROVIDER NOTE - ATTENDING APP SHARED VISIT CONTRIBUTION OF CARE
21 yo F with no significant PMH presents to the ER for Rt hand laceration from broken glass today. Pt has a laceration to the right volar aspect of thumb, and another laceration to the proximal volar aspect of right hand (just above the wrist). Pt unsure of her tetanus status. Pt denies any other injury. No n/v/d/fever/chills/sob/cp/HA/purulent drainage. Pt has no weakness or numbness to hand.     Agree with PA exam and management. Will need laceration repair and tetanus to be updated.     ALL: penicillin  PMH denies  Meds denies  SH denies smoking or etoh  PMD Yeomanyobany Huerta  LMP 2 weeks ago regular periods

## 2023-06-06 NOTE — ED PROCEDURE NOTE - ATTENDING APP SHARED VISIT CONTRIBUTION OF CARE
I was present for and supervised the key/critical aspects of the procedures performed during the care of the patient.---laceration repair
I was present for and supervised the key/critical aspects of the procedures performed during the care of the patient.---laceration repair

## 2023-06-06 NOTE — ED PROCEDURE NOTE - NS ED ATTENDING STATEMENT MOD
This was a shared visit with the BEAU. I reviewed and verified the documentation and independently performed the documented:
This was a shared visit with the BEAU. I reviewed and verified the documentation and independently performed the documented:

## 2023-06-27 ENCOUNTER — EMERGENCY (EMERGENCY)
Facility: HOSPITAL | Age: 23
LOS: 0 days | Discharge: LEFT BEFORE DISCHARGE | End: 2023-06-28
Attending: EMERGENCY MEDICINE
Payer: MEDICAID

## 2023-06-27 ENCOUNTER — NON-APPOINTMENT (OUTPATIENT)
Age: 23
End: 2023-06-27

## 2023-06-27 VITALS
SYSTOLIC BLOOD PRESSURE: 112 MMHG | TEMPERATURE: 98 F | DIASTOLIC BLOOD PRESSURE: 57 MMHG | RESPIRATION RATE: 18 BRPM | WEIGHT: 169.98 LBS | OXYGEN SATURATION: 99 % | HEART RATE: 60 BPM

## 2023-06-27 DIAGNOSIS — M79.646 PAIN IN UNSPECIFIED FINGER(S): ICD-10-CM

## 2023-06-27 DIAGNOSIS — Z98.84 BARIATRIC SURGERY STATUS: Chronic | ICD-10-CM

## 2023-06-27 DIAGNOSIS — Z53.21 PROCEDURE AND TREATMENT NOT CARRIED OUT DUE TO PATIENT LEAVING PRIOR TO BEING SEEN BY HEALTH CARE PROVIDER: ICD-10-CM

## 2023-06-27 PROCEDURE — L9991: CPT

## 2024-03-21 ENCOUNTER — NON-APPOINTMENT (OUTPATIENT)
Age: 24
End: 2024-03-21

## 2024-04-11 ENCOUNTER — EMERGENCY (EMERGENCY)
Facility: HOSPITAL | Age: 24
LOS: 0 days | Discharge: HOME | End: 2024-04-11
Attending: STUDENT IN AN ORGANIZED HEALTH CARE EDUCATION/TRAINING PROGRAM
Payer: MEDICAID

## 2024-04-11 VITALS
TEMPERATURE: 98 F | OXYGEN SATURATION: 99 % | DIASTOLIC BLOOD PRESSURE: 64 MMHG | RESPIRATION RATE: 18 BRPM | HEART RATE: 85 BPM | SYSTOLIC BLOOD PRESSURE: 116 MMHG | WEIGHT: 169.98 LBS
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
## 2024-04-11 DIAGNOSIS — Z98.84 BARIATRIC SURGERY STATUS: Chronic | ICD-10-CM
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
## 2024-04-11 DIAGNOSIS — Z88.0 ALLERGY STATUS TO PENICILLIN: ICD-10-CM

## 2024-04-11 DIAGNOSIS — S60.453A SUPERFICIAL FOREIGN BODY OF LEFT MIDDLE FINGER, INITIAL ENCOUNTER: ICD-10-CM

## 2024-04-11 PROCEDURE — 99283 EMERGENCY DEPT VISIT LOW MDM: CPT

## 2024-04-11 PROCEDURE — 99282 EMERGENCY DEPT VISIT SF MDM: CPT

## 2024-04-11 NOTE — ED PROVIDER NOTE - OBJECTIVE STATEMENT
23-year-old female with no reported past medical history presents the ED for evaluation.  Patient reports that she put on her ring last night and fell asleep with it on, awoke this morning her L 3rd digit was swollen and she was unable to take it off.  Has tried using soap and stretching with minimal improvement.  Denies any trauma to the area no other complaints.

## 2024-04-11 NOTE — ED PROVIDER NOTE - PATIENT PORTAL LINK FT
You can access the FollowMyHealth Patient Portal offered by [facility] by registering at the following website: [email or web address]. By joining [employer]’s FollowMyHealth portal, you will also be able to view your health information using other applications (apps) compatible with our system.

## 2024-04-11 NOTE — ED ADULT NURSE NOTE - NSFALLUNIVINTERV_ED_ALL_ED
Bed/Stretcher in lowest position, wheels locked, appropriate side rails in place/Call bell, personal items and telephone in reach/Instruct patient to call for assistance before getting out of bed/chair/stretcher/Non-slip footwear applied when patient is off stretcher/[place] to call system/Physically safe environment - no spills, clutter or unnecessary equipment/Purposeful proactive rounding/Room/bathroom lighting operational, light cord in reach

## 2024-04-11 NOTE — ED PROVIDER NOTE - PHYSICAL EXAMINATION
CONST: Well appearing in NAD  MS: L 3rd digit swollen w two gold rings at base of digit   SKIN: Warm, dry, no acute rashes.   NEURO: A&Ox3, No focal deficits. Strength 5/5 with no sensory deficits. Steady gait

## 2024-04-11 NOTE — ED PROVIDER NOTE - NSFOLLOWUPINSTRUCTIONS_ED_ALL_ED_FT
Your hand will continue to be swollen but should improve over the next couple of days.  There may be some pain after the numbing agent wears off.  Please return to the ED if swelling gets worse or if you are having uncontrollable pain that does not resolve with Tylenol or Motrin.  Please follow-up with your primary care doctor.    Hand or Foot Foreign Body, Adult  A foreign body is an object that gets into your body that should not be there. The hands and feet are common entry points for foreign bodies. Common examples of foreign bodies include:  Wood splinters.  Thorns.  Pieces of glass, wood, metal, or plastic.  Metal objects such as needles, nails, and fishing hooks.  Pencil lead.  Foreign bodies that are not removed quickly may cause infection.    What are the causes?  A foreign body can get in your hand or foot through an injury, such as a scrape or cut, or if something punctures your skin.    What increases the risk?  If you work with or around broken glass, wood, fiberglass, or stone you are at a higher risk of getting a foreign body in a hand or foot. Going barefoot also increases your risk of getting a foreign body in a hand or foot.    What are the signs or symptoms?  Symptoms of having recently gotten a foreign body in your hand or foot include:  Pain.  Redness.  Swelling.  Tenderness.  Numbness or tingling.  Noticing something under the skin.  If the foreign body has caused an infection, symptoms may also include:  Fever.  Warmth in the area of the puncture.  A lump that you can feel under the skin.  Not being able to use your hand or put weight (bear weight) on your foot.  Greenish or yellow fluid coming from the puncture area.  How is this diagnosed?  Your health care provider will diagnose a foreign body in the hand or foot based on your symptoms, history, and examining your wound. If the foreign body is deep, you may also have tests, such as an X-ray, MRI, or ultrasound.    How is this treated?  Your health care provider may be able to remove the foreign body while exploring your wound during the diagnosis. If the foreign body is deep or in a wound that is infected, you may need to have a procedure to remove the foreign body.    If an incision is needed, it may be closed with stitches (sutures), skin glue, or adhesive strips. A bandage (dressing) will be placed over the incision.    You may also need to get a tetanus shot or take antibiotic medicines to prevent or treat an infection.    Follow these instructions at home:  Medicines    Take over-the-counter and prescription medicines only as told by your health care provider.  If you were prescribed an antibiotic medicine, take the antibiotic as told by your health care provider. Do not stop taking the antibiotic even if you start to feel better.  Wound care    Two stitched wounds. One is normal. The other is red with pus and infected.   Follow instructions from your health care provider about how to take care of your wound or incision. Make sure you:  Wash your hands with soap and water at least 20 seconds before and after you change your dressing. If soap and water are not available, use hand .  Change your dressing as told by your health care provider.  Leave sutures, skin glue, or adhesive strips in place. These skin closures may need to stay in place for 2 weeks or longer. If adhesive strip edges start to loosen and curl up, you may trim the loose edges. Do not remove adhesive strips completely unless your health care provider tells you to do that.  Check your wound or incision area every day for signs of infection. Check for:  More redness, swelling, or pain.  More fluid or blood.  Warmth.  Pus or a bad smell.  General instructions    Return to your normal activities as told by your health care provider. Ask your health care provider what activities are safe for you.  Do not take baths, swim, or use a hot tub until your health care provider approves. Ask your health care provider if you may take showers. You may only be allowed to take sponge baths.  Keep all follow-up visits. This is important.  How is this prevented?  To protect yourself:  Wear gloves when working with sharp objects or wood.  Do not walk barefoot in areas where there may be sharp objects.  Wear thick-soled shoes or boots when walking in areas where there are sharp objects.  Contact a health care provider if:  You have a foreign body that has not come out or been removed.  You have more redness, swelling, or pain around the wound or incision area.  You have more fluid or blood coming from the wound or incision area.  Your wound or incision area feels warm to the touch.  You have pus or a bad smell coming from the wound or incision area.  You have a fever.  You were given a tetanus shot, and you have swelling, severe pain, redness, or bleeding at the injection site.  Get help right away if:  Your wound or incision area becomes numb.  You cannot use your hand or foot.  Summary  A foreign body is an object that gets into your body that should not be there. Your hands and feet are common entry points for foreign bodies.  Your health care provider may be able to remove the foreign body while exploring your wound during the diagnosis. If the foreign body is deep or in a wound that is infected, you may need to have a procedure to remove the foreign body.  You may also need to get a tetanus shot or take antibiotic medicines to prevent or treat an infection.  Check your wound or incision area every day for signs of infection.  This information is not intended to replace advice given to you by your health care provider. Make sure you discuss any questions you have with your health care provider.

## 2024-04-11 NOTE — ED PROVIDER NOTE - CLINICAL SUMMARY MEDICAL DECISION MAKING FREE TEXT BOX
24 yo female, no pertinent PMHx, presenting for ring stuck to her left 3rd digit after sleeping with her ring on last night. Denies other trauma, numbness, weakness. Digital block applied. Ring removed with string method. Given return precautions and follow up outpatient. Patient with full ROM of digit. Patient comfortable with plan.

## 2024-04-11 NOTE — ED ADULT TRIAGE NOTE - CHIEF COMPLAINT QUOTE
Pt presents to the ED w/ c/o of ring stuck on middle finger. Pt states she woke up with her finger swollen and it has progressively gotten worse.

## 2024-07-24 NOTE — ED PROVIDER NOTE - IV ALTEPLASE EXCL REL HIDDEN
[FreeTextEntry1] : Referred for ?colon screening and fatty liver. She reports that she had colonoscopy and EGD performed in 2022 with Dr. Rylan Hung. Unclear when to repeat colonoscopy and reports not available.  Occ epigastric burning only with hot drinks Occ Acid reflux related to food intake - coffee, acidic and spicy food Takes Nexium as needed   Denies odynophagia, dysphagia, early satiety, nausea or vomiting, constipation or diarrhea Has daily bm without BRBPR   Reports poor diet and Steve exercise classes.   show

## 2024-10-08 NOTE — ED ADULT NURSE NOTE - NSICDXPASTMEDICALHX_GEN_ALL_CORE_FT
Discussed code status with the patient she does not wish to be on the ventilator or have chest compressions.  She was changed to a DNR     PAST MEDICAL HISTORY:  No pertinent past medical history

## 2024-11-08 NOTE — ED PROVIDER NOTE - NS_EDPROVIDERDISPOUSERTYPE_ED_A_ED
Medication:   Requested Prescriptions     Pending Prescriptions Disp Refills    venlafaxine (EFFEXOR XR) 150 MG extended release capsule [Pharmacy Med Name: VENLAFAXINE HCL  MG CAP] 90 capsule 1     Sig: TAKE 1 CAPSULE BY MOUTH EVERY DAY     Last Filled:  8-12-24    Last appt: 3/12/2024   Next appt: Visit date not found    Attending Attestation (For Attendings USE Only)...

## 2024-12-09 ENCOUNTER — APPOINTMENT (OUTPATIENT)
Dept: PLASTIC SURGERY | Facility: CLINIC | Age: 24
End: 2024-12-09

## 2025-02-20 NOTE — ED PROVIDER NOTE - NS ED MD DISPO DISCHARGE CCDA
[facility]   2025     Name: Lisbet Sanchez   : 1961       Orders:  - Check orthostatic vital signs  - Check CBC, BMP, TSH on 25 dx decreased appetite, hypothyroidism  - Discontinue tizanidine PRN  - Discontinue current oxycodone orders  - Add oxycodone 15mg QID (8am, 12pm ,4pm, 8pm) dx chronic pain  - Add oxycodone 15mg at bedtime (do not give within 4hrs of scheduled oxycodone dose) dx chronic pain      Electronically signed by  MELODY Jacobsen CNP on 2025 at 3:35 PM    
Patient/Caregiver provided printed discharge information.

## 2025-05-31 ENCOUNTER — EMERGENCY (EMERGENCY)
Facility: HOSPITAL | Age: 25
LOS: 0 days | Discharge: HOME | End: 2025-05-31
Attending: EMERGENCY MEDICINE
Payer: MEDICAID

## 2025-05-31 VITALS
OXYGEN SATURATION: 100 % | RESPIRATION RATE: 17 BRPM | WEIGHT: 179.9 LBS | HEART RATE: 56 BPM | TEMPERATURE: 98 F | DIASTOLIC BLOOD PRESSURE: 73 MMHG | SYSTOLIC BLOOD PRESSURE: 110 MMHG

## 2025-05-31 DIAGNOSIS — Z98.84 BARIATRIC SURGERY STATUS: Chronic | ICD-10-CM

## 2025-05-31 DIAGNOSIS — H57.89 OTHER SPECIFIED DISORDERS OF EYE AND ADNEXA: ICD-10-CM

## 2025-05-31 DIAGNOSIS — Z77.098 CONTACT WITH AND (SUSPECTED) EXPOSURE TO OTHER HAZARDOUS, CHIEFLY NONMEDICINAL, CHEMICALS: ICD-10-CM

## 2025-05-31 DIAGNOSIS — Z88.0 ALLERGY STATUS TO PENICILLIN: ICD-10-CM

## 2025-05-31 PROCEDURE — 99283 EMERGENCY DEPT VISIT LOW MDM: CPT

## 2025-05-31 PROCEDURE — 99284 EMERGENCY DEPT VISIT MOD MDM: CPT | Mod: 25

## 2025-05-31 RX ORDER — TETRACAINE HYDROCHLORIDE 5 MG/ML
1 SOLUTION OPHTHALMIC ONCE
Refills: 0 | Status: COMPLETED | OUTPATIENT
Start: 2025-05-31 | End: 2025-05-31

## 2025-05-31 RX ORDER — FLUORESCEIN SODIUM 0.6 MG
1 STRIP OPHTHALMIC (EYE) ONCE
Refills: 0 | Status: COMPLETED | OUTPATIENT
Start: 2025-05-31 | End: 2025-05-31

## 2025-05-31 RX ADMIN — Medication 1 APPLICATION(S): at 01:45

## 2025-05-31 RX ADMIN — TETRACAINE HYDROCHLORIDE 1 DROP(S): 5 SOLUTION OPHTHALMIC at 01:45
